# Patient Record
Sex: FEMALE | Race: WHITE | NOT HISPANIC OR LATINO | Employment: FULL TIME | URBAN - METROPOLITAN AREA
[De-identification: names, ages, dates, MRNs, and addresses within clinical notes are randomized per-mention and may not be internally consistent; named-entity substitution may affect disease eponyms.]

---

## 2024-11-15 ENCOUNTER — OFFICE VISIT (OUTPATIENT)
Dept: FAMILY MEDICINE CLINIC | Facility: CLINIC | Age: 79
End: 2024-11-15

## 2024-11-15 VITALS
TEMPERATURE: 98 F | SYSTOLIC BLOOD PRESSURE: 184 MMHG | HEART RATE: 86 BPM | HEIGHT: 60 IN | WEIGHT: 120.4 LBS | OXYGEN SATURATION: 98 % | BODY MASS INDEX: 23.64 KG/M2 | DIASTOLIC BLOOD PRESSURE: 95 MMHG

## 2024-11-15 DIAGNOSIS — Z13.1 SCREENING FOR DIABETES MELLITUS: ICD-10-CM

## 2024-11-15 DIAGNOSIS — Z11.59 NEED FOR HEPATITIS C SCREENING TEST: ICD-10-CM

## 2024-11-15 DIAGNOSIS — Z13.220 ENCOUNTER FOR LIPID SCREENING FOR CARDIOVASCULAR DISEASE: ICD-10-CM

## 2024-11-15 DIAGNOSIS — R42 DIZZINESS: Primary | ICD-10-CM

## 2024-11-15 DIAGNOSIS — M54.2 NECK PAIN: ICD-10-CM

## 2024-11-15 DIAGNOSIS — Z11.4 SCREENING FOR HIV (HUMAN IMMUNODEFICIENCY VIRUS): ICD-10-CM

## 2024-11-15 DIAGNOSIS — R26.81 UNSTEADINESS ON FEET: ICD-10-CM

## 2024-11-15 DIAGNOSIS — Z13.6 ENCOUNTER FOR LIPID SCREENING FOR CARDIOVASCULAR DISEASE: ICD-10-CM

## 2024-11-15 DIAGNOSIS — H53.2 DOUBLE VISION: ICD-10-CM

## 2024-11-15 DIAGNOSIS — E55.9 VITAMIN D DEFICIENCY, UNSPECIFIED: ICD-10-CM
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
## 2024-11-21 ENCOUNTER — APPOINTMENT (OUTPATIENT)
Dept: LAB | Facility: CLINIC | Age: 79
End: 2024-11-21
Payer: COMMERCIAL

## 2024-11-21 ENCOUNTER — HOSPITAL ENCOUNTER (OUTPATIENT)
Dept: RADIOLOGY | Facility: HOSPITAL | Age: 79
Discharge: HOME | End: 2024-11-21
Payer: COMMERCIAL

## 2024-11-21 DIAGNOSIS — E55.9 VITAMIN D DEFICIENCY, UNSPECIFIED: ICD-10-CM

## 2024-11-21 DIAGNOSIS — Z13.6 ENCOUNTER FOR LIPID SCREENING FOR CARDIOVASCULAR DISEASE: ICD-10-CM

## 2024-11-21 DIAGNOSIS — Z11.4 SCREENING FOR HIV (HUMAN IMMUNODEFICIENCY VIRUS): ICD-10-CM

## 2024-11-21 DIAGNOSIS — H53.2 DOUBLE VISION: ICD-10-CM

## 2024-11-21 DIAGNOSIS — R26.81 UNSTEADINESS ON FEET: ICD-10-CM

## 2024-11-21 DIAGNOSIS — Z13.1 SCREENING FOR DIABETES MELLITUS: ICD-10-CM

## 2024-11-21 DIAGNOSIS — Z13.220 ENCOUNTER FOR LIPID SCREENING FOR CARDIOVASCULAR DISEASE: ICD-10-CM

## 2024-11-21 DIAGNOSIS — Z11.59 NEED FOR HEPATITIS C SCREENING TEST: ICD-10-CM

## 2024-11-21 DIAGNOSIS — R42 DIZZINESS: ICD-10-CM

## 2024-11-21 DIAGNOSIS — M54.2 NECK PAIN: ICD-10-CM

## 2024-11-21 LAB
25(OH)D3 SERPL-MCNC: 20.5 NG/ML (ref 30–100)
ALBUMIN SERPL BCG-MCNC: 4.3 G/DL (ref 3.5–5)
ALP SERPL-CCNC: 65 U/L (ref 34–104)
ALT SERPL W P-5'-P-CCNC: 14 U/L (ref 7–52)
ANION GAP SERPL CALCULATED.3IONS-SCNC: 6 MMOL/L (ref 4–13)
AST SERPL W P-5'-P-CCNC: 21 U/L (ref 13–39)
BASOPHILS # BLD AUTO: 0.05 THOUSANDS/ÂΜL (ref 0–0.1)
BASOPHILS NFR BLD AUTO: 1 % (ref 0–1)
BILIRUB SERPL-MCNC: 0.36 MG/DL (ref 0.2–1)
BUN SERPL-MCNC: 20 MG/DL (ref 5–25)
CALCIUM SERPL-MCNC: 10.1 MG/DL (ref 8.4–10.2)
CHLORIDE SERPL-SCNC: 106 MMOL/L (ref 96–108)
CHOLEST SERPL-MCNC: 291 MG/DL (ref ?–200)
CO2 SERPL-SCNC: 28 MMOL/L (ref 21–32)
CREAT SERPL-MCNC: 0.74 MG/DL (ref 0.6–1.3)
EOSINOPHIL # BLD AUTO: 0.16 THOUSAND/ÂΜL (ref 0–0.61)
EOSINOPHIL NFR BLD AUTO: 4 % (ref 0–6)
ERYTHROCYTE [DISTWIDTH] IN BLOOD BY AUTOMATED COUNT: 14 % (ref 11.6–15.1)
GFR SERPL CREATININE-BSD FRML MDRD: 77 ML/MIN/1.73SQ M
GLUCOSE P FAST SERPL-MCNC: 86 MG/DL (ref 65–99)
HCT VFR BLD AUTO: 38.9 % (ref 34.8–46.1)
HCV AB SER QL: NORMAL
HDLC SERPL-MCNC: 62 MG/DL
HGB BLD-MCNC: 12.2 G/DL (ref 11.5–15.4)
IMM GRANULOCYTES # BLD AUTO: 0.01 THOUSAND/UL (ref 0–0.2)
IMM GRANULOCYTES NFR BLD AUTO: 0 % (ref 0–2)
LDLC SERPL CALC-MCNC: 201 MG/DL (ref 0–100)
LYMPHOCYTES # BLD AUTO: 1.24 THOUSANDS/ÂΜL (ref 0.6–4.47)
LYMPHOCYTES NFR BLD AUTO: 32 % (ref 14–44)
MCH RBC QN AUTO: 26.3 PG (ref 26.8–34.3)
MCHC RBC AUTO-ENTMCNC: 31.4 G/DL (ref 31.4–37.4)
MCV RBC AUTO: 84 FL (ref 82–98)
MONOCYTES # BLD AUTO: 0.37 THOUSAND/ÂΜL (ref 0.17–1.22)
MONOCYTES NFR BLD AUTO: 10 % (ref 4–12)
NEUTROPHILS # BLD AUTO: 2.04 THOUSANDS/ÂΜL (ref 1.85–7.62)
NEUTS SEG NFR BLD AUTO: 53 % (ref 43–75)
NONHDLC SERPL-MCNC: 229 MG/DL
NRBC BLD AUTO-RTO: 0 /100 WBCS
PLATELET # BLD AUTO: 231 THOUSANDS/UL (ref 149–390)
PMV BLD AUTO: 10.5 FL (ref 8.9–12.7)
POTASSIUM SERPL-SCNC: 3.9 MMOL/L (ref 3.5–5.3)
PROT SERPL-MCNC: 7.1 G/DL (ref 6.4–8.4)
RBC # BLD AUTO: 4.63 MILLION/UL (ref 3.81–5.12)
SODIUM SERPL-SCNC: 140 MMOL/L (ref 135–147)
TRIGL SERPL-MCNC: 138 MG/DL (ref ?–150)
TSH SERPL DL<=0.05 MIU/L-ACNC: 1.87 UIU/ML (ref 0.45–4.5)
VIT B12 SERPL-MCNC: 1042 PG/ML (ref 180–914)
WBC # BLD AUTO: 3.87 THOUSAND/UL (ref 4.31–10.16)

## 2024-11-21 PROCEDURE — 86803 HEPATITIS C AB TEST: CPT

## 2024-11-21 PROCEDURE — 80061 LIPID PANEL: CPT

## 2024-11-21 PROCEDURE — 87389 HIV-1 AG W/HIV-1&-2 AB AG IA: CPT

## 2024-11-21 PROCEDURE — 80053 COMPREHEN METABOLIC PANEL: CPT

## 2024-11-21 PROCEDURE — 82306 VITAMIN D 25 HYDROXY: CPT

## 2024-11-21 PROCEDURE — 72040 X-RAY EXAM NECK SPINE 2-3 VW: CPT

## 2024-11-21 PROCEDURE — 84443 ASSAY THYROID STIM HORMONE: CPT

## 2024-11-21 PROCEDURE — 36415 COLL VENOUS BLD VENIPUNCTURE: CPT

## 2024-11-21 PROCEDURE — 85025 COMPLETE CBC W/AUTO DIFF WBC: CPT

## 2024-11-21 PROCEDURE — 82607 VITAMIN B-12: CPT

## 2024-11-22 ENCOUNTER — RESULTS FOLLOW-UP (OUTPATIENT)
Dept: FAMILY MEDICINE CLINIC | Facility: CLINIC | Age: 79
End: 2024-11-22

## 2024-11-22 ENCOUNTER — OFFICE VISIT (OUTPATIENT)
Dept: FAMILY MEDICINE CLINIC | Facility: CLINIC | Age: 79
End: 2024-11-22
Payer: COMMERCIAL

## 2024-11-22 VITALS
HEART RATE: 73 BPM | OXYGEN SATURATION: 96 % | TEMPERATURE: 97.5 F | SYSTOLIC BLOOD PRESSURE: 170 MMHG | DIASTOLIC BLOOD PRESSURE: 108 MMHG

## 2024-11-22 DIAGNOSIS — I10 PRIMARY HYPERTENSION: Primary | ICD-10-CM

## 2024-11-22 DIAGNOSIS — E55.9 VITAMIN D DEFICIENCY: ICD-10-CM

## 2024-11-22 DIAGNOSIS — Z78.0 POST-MENOPAUSAL: ICD-10-CM

## 2024-11-22 DIAGNOSIS — E78.00 PURE HYPERCHOLESTEROLEMIA: ICD-10-CM

## 2024-11-22 DIAGNOSIS — R42 DIZZINESS: ICD-10-CM

## 2024-11-22 DIAGNOSIS — M43.12 ANTEROLISTHESIS OF CERVICAL SPINE: ICD-10-CM

## 2024-11-22 PROBLEM — R03.0 ELEVATED BLOOD PRESSURE READING: Status: ACTIVE | Noted: 2024-11-22

## 2024-11-22 LAB — HIV 1+2 AB+HIV1 P24 AG SERPL QL IA: NON REACTIVE

## 2024-11-22 PROCEDURE — 99213 OFFICE O/P EST LOW 20 MIN: CPT

## 2024-11-22 PROCEDURE — G2211 COMPLEX E/M VISIT ADD ON: HCPCS

## 2024-11-22 RX ORDER — AMLODIPINE BESYLATE 5 MG/1
5 TABLET ORAL DAILY
Qty: 30 TABLET | Refills: 0 | Status: SHIPPED | OUTPATIENT
Start: 2024-11-22

## 2024-11-22 RX ORDER — ROSUVASTATIN CALCIUM 20 MG/1
20 TABLET, COATED ORAL DAILY
Qty: 90 TABLET | Refills: 0 | Status: SHIPPED | OUTPATIENT
Start: 2024-11-22

## 2024-11-22 NOTE — ASSESSMENT & PLAN NOTE
Blood Pressure: (!) 170/108, goal BP <140/90  Cr: 0.74, GFR 77, normal kidney function    Plan:  Start amlodipine 5mg daily  Home blood pressure log   ED precautions given    Orders:    rosuvastatin (CRESTOR) 20 MG tablet; Take 1 tablet (20 mg total) by mouth daily    amLODIPine (NORVASC) 5 mg tablet; Take 1 tablet (5 mg total) by mouth daily

## 2024-11-22 NOTE — ASSESSMENT & PLAN NOTE
Vitamin D level 20  Start vitamin D 1000U daily   Orders:    Cholecalciferol (VITAMIN D3) 1,000 units tablet; Take 1 tablet (1,000 Units total) by mouth daily
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
## 2024-11-22 NOTE — ASSESSMENT & PLAN NOTE
Hyperlipidemia not at goal  Lab Results   Component Value Date    CHOLESTEROL 291 (H) 11/21/2024    TRIG 138 11/21/2024    HDL 62 11/21/2024    LDLCALC 201 (H) 11/21/2024     The 10-year ASCVD risk score (Clarence RENDON, et al., 2019) is: 44.8%    Values used to calculate the score:      Age: 79 years      Sex: Female      Is Non- : No      Diabetic: No      Tobacco smoker: No      Systolic Blood Pressure: 170 mmHg      Is BP treated: Yes      HDL Cholesterol: 62 mg/dL      Total Cholesterol: 291 mg/dL    Plan:  Start Crestor 20mg   F/u Lipid panel in 6 months    Orders:    rosuvastatin (CRESTOR) 20 MG tablet; Take 1 tablet (20 mg total) by mouth daily
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
## 2024-11-25 ENCOUNTER — NEW PATIENT (OUTPATIENT)
Dept: URBAN - METROPOLITAN AREA CLINIC 6 | Facility: CLINIC | Age: 79
End: 2024-11-25

## 2024-11-25 DIAGNOSIS — H25.813: ICD-10-CM

## 2024-11-25 DIAGNOSIS — S06.0X0D: ICD-10-CM

## 2024-11-25 DIAGNOSIS — H50.10: ICD-10-CM

## 2024-11-25 PROCEDURE — 92083 EXTENDED VISUAL FIELD XM: CPT

## 2024-11-25 PROCEDURE — 92004 COMPRE OPH EXAM NEW PT 1/>: CPT

## 2024-11-25 ASSESSMENT — TONOMETRY
OS_IOP_MMHG: 9
OD_IOP_MMHG: 10

## 2024-11-25 ASSESSMENT — VISUAL ACUITY
OD_PH: 20/60
OS_CC: 20/70-2
OD_CC: 20/100-1
OU_CC: J2
OS_PH: 20/60+2
OU_CC: 20/100

## 2024-12-10 ENCOUNTER — OFFICE VISIT (OUTPATIENT)
Dept: FAMILY MEDICINE CLINIC | Facility: CLINIC | Age: 79
End: 2024-12-10
Payer: COMMERCIAL

## 2024-12-10 VITALS
OXYGEN SATURATION: 97 % | TEMPERATURE: 97.8 F | SYSTOLIC BLOOD PRESSURE: 158 MMHG | HEART RATE: 87 BPM | DIASTOLIC BLOOD PRESSURE: 100 MMHG

## 2024-12-10 DIAGNOSIS — E78.00 PURE HYPERCHOLESTEROLEMIA: ICD-10-CM

## 2024-12-10 DIAGNOSIS — H53.2 DIPLOPIA: ICD-10-CM

## 2024-12-10 DIAGNOSIS — R42 DIZZINESS: ICD-10-CM

## 2024-12-10 DIAGNOSIS — R51.9 PERSISTENT HEADACHES: ICD-10-CM

## 2024-12-10 DIAGNOSIS — I10 PRIMARY HYPERTENSION: Primary | ICD-10-CM

## 2024-12-10 DIAGNOSIS — M43.12 ANTEROLISTHESIS OF CERVICAL SPINE: ICD-10-CM

## 2024-12-10 PROCEDURE — 99214 OFFICE O/P EST MOD 30 MIN: CPT

## 2024-12-10 PROCEDURE — 93000 ELECTROCARDIOGRAM COMPLETE: CPT

## 2024-12-10 RX ORDER — AMLODIPINE BESYLATE 5 MG/1
TABLET ORAL
Qty: 60 TABLET | Refills: 0 | Status: SHIPPED | OUTPATIENT
Start: 2024-12-10

## 2024-12-10 RX ORDER — AMLODIPINE BESYLATE 5 MG/1
TABLET ORAL
Qty: 60 TABLET | Refills: 0 | Status: SHIPPED | OUTPATIENT
Start: 2024-12-10 | End: 2024-12-10

## 2024-12-10 NOTE — ASSESSMENT & PLAN NOTE
Hyperlipidemia not at goal  Lab Results   Component Value Date    CHOLESTEROL 291 (H) 11/21/2024    TRIG 138 11/21/2024    HDL 62 11/21/2024    LDLCALC 201 (H) 11/21/2024     The 10-year ASCVD risk score (Clarence RENDON, et al., 2019) is: 40.1%    Values used to calculate the score:      Age: 79 years      Sex: Female      Is Non- : No      Diabetic: No      Tobacco smoker: No      Systolic Blood Pressure: 158 mmHg      Is BP treated: Yes      HDL Cholesterol: 62 mg/dL      Total Cholesterol: 291 mg/dL    Plan:  Start Crestor 20mg   F/u Lipid panel in 5 months

## 2024-12-10 NOTE — ASSESSMENT & PLAN NOTE
Blood Pressure: 158/100, goal BP <140/90.   Home blood pressures: 125-141/78-80s  Cr: 0.74, GFR 77, normal kidney function    Plan:  Increased to amlodipine 5mg BID  Home blood pressure log   ED precautions given    Orders:    POCT ECG    amLODIPine (NORVASC) 5 mg tablet; Take one tablet (5mg) twice per day

## 2024-12-10 NOTE — PROGRESS NOTES
Name: Doreen Bello      : 1945      MRN: [id number]  Encounter Provider: Jennifer Smith DO  Encounter Date: 12/10/2024   [facility]  :  Assessment & Plan  Primary hypertension  Blood Pressure: 158/100, goal BP <140/90.   Home blood pressures: 125-141/78-80s  Cr: 0.74, GFR 77, normal kidney function    Plan:  Increased to amlodipine 5mg BID  Home blood pressure log   ED precautions given    Orders:    POCT ECG    amLODIPine (NORVASC) 5 mg tablet; Take one tablet (5mg) twice per day      Persistent headaches  History of multiple concussions and recent head trauma with occipital lobe head strike which was over one month ago.   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 Basal ganglia exam intact with normal nose to finger and normal MARKO. Normal heel to shine  No further episodes of dizziness since marathon.   No anemia, thyroid disorder, or electrolyte abnormalities on labs  Dizziness has resolved completely   Suspect dizziness most likely from uncontrolled HTN, other ddx includes vertigo vs previous stroke (however symptoms of dizziness has resolved and no red flag symptoms) vs anxiety vs malnutrition  Per  patient, ophthalmology noted patient has cataracts but no other abnormal findings  PCOT EKG: NSR, rate 68        Plan:  CT head without contrast, CT head and neck with IV contrast  F/u Echo due to black vision with heavy lifting  Increased to amlodipine 5mg BID  Monitor home blood pressure daily  Neurology referral  Discussed in detail ED precautions  Follow up in 2 week    Orders:    Ambulatory Referral to Neurology; Future    CT head wo contrast; Future    CTA head and neck w wo contrast; Future    Echo complete w/ contrast if indicated; Future    POCT ECG    Dizziness  History of multiple concussions and recent head  trauma with occipital lobe head strike which was over one month ago.   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 Basal ganglia exam intact with normal nose to finger and normal MARKO. Normal heel to shine  No further episodes of dizziness since marathon.   No anemia, thyroid disorder, or electrolyte abnormalities on labs  Dizziness has resolved completely   Suspect dizziness most likely from uncontrolled HTN, other ddx includes vertigo vs previous stroke (however symptoms of dizziness has resolved and no red flag symptoms) vs anxiety vs malnutrition  Per  patient, ophthalmology noted patient has cataracts but no other abnormal findings        Plan:  CT head without contrast, CT head and neck with IV contrast  F/u Echo due to black vision with heavy lifting  Increased to amlodipine 5mg BID  Monitor home blood pressure daily  Neurology referral  Discussed in detail ED precautions  Follow up in 2 week    Orders:    Echo complete w/ contrast if indicated; Future    POCT ECG      Diplopia  History of multiple concussions and recent head trauma with occipital lobe head strike which was over one month ago.   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 Basal ganglia exam intact with normal nose to finger and normal MARKO. Normal heel to shine  EOMI, notes right eye has blurred vision in the center after EOMI when tested alone, notes left eye has slight double vision noted to be faint on top of each other vertically which is new when tested alone.  No further episodes of dizziness since marathon.   No anemia, thyroid disorder, or electrolyte abnormalities on labs  Dizziness has resolved completely   Suspect dizziness most likely from uncontrolled HTN, other ddx  includes vertigo vs previous stroke (however symptoms of dizziness has resolved and no red flag symptoms) vs anxiety vs malnutrition  Per  patient, ophthalmology noted patient has cataracts but no other abnormal findings  PCOT EKG: NSR, rate 68        Plan:  CT head without contrast, CT head and neck with IV contrast  F/u Echo due to black vision with heavy lifting  Increased to amlodipine 5mg BID  Monitor home blood pressure daily  Neurology referral  Discussed in detail ED precautions  Follow up in 2 week  Orders:    Ambulatory Referral to Neurology; Future    CT head wo contrast; Future    CTA head and neck w wo contrast; Future    Echo complete w/ contrast if indicated; Future    POCT ECG    Pure hypercholesterolemia  Hyperlipidemia not at goal  Lab Results   Component Value Date    CHOLESTEROL 291 (H) 11/21/2024    TRIG 138 11/21/2024    HDL 62 11/21/2024    LDLCALC 201 (H) 11/21/2024     The 10-year ASCVD risk score (Clarence RENDON, et al., 2019) is: 40.1%    Values used to calculate the score:      Age: 79 years      Sex: Female      Is Non- : No      Diabetic: No      Tobacco smoker: No      Systolic Blood Pressure: 158 mmHg      Is BP treated: Yes      HDL Cholesterol: 62 mg/dL      Total Cholesterol: 291 mg/dL    Plan:  Start Crestor 20mg   F/u Lipid panel in 5 months           Anterolisthesis of cervical spine  History of trauma that resulted in back on head strike, No LOC on Octobber 23rd, 2024, with history of multiple concussions in the past  CXR: Mild anterolisthesis of C3 on C4. Slight anterolisthesis of C4 on C5. Multilevel degenerative facet hypertrophy and disc space narrowing in the mid to lower cervical spine.  No neck pain or red flag symptoms     Plan:  Continue to monitor   ED precautions given                   History of Present Illness     HPI  78 yo female with pmhx including multiple concussions, history of perpherial vision lost 2/2 to diplopia and strabismus release  "in 1970s, present to the office for follow-up for dizziness.     Reports persistent headache since last visits-occipital that radiates to frontal lobe. No dizziness. No loss of equilbirum. Has popping of neck.   Reports vision has been \"weird\", very difficult to make one object one with visit. History of strabimus, released strabimus in 1970s    Saw Opthpmologist, Dr. Forbes, said it was cataracts, no other ophthalmologist concerns.      Takes amlodipine 5mg and crestor 20mg at night. Denies any lightheadedness, dizziness.      Updated home Blood pressures on amlodipine 5mg:       Reports able to turn head turn left and right without pain. Denies any numbness or tingling down arms. Denies any hand  strength.           Previous Blood pressure log at home prior to starting amlodipine:           Review of Systems   Constitutional:  Negative for chills, diaphoresis and fever.   Eyes:  Negative for visual disturbance.   Respiratory:  Negative for shortness of breath and wheezing.    Cardiovascular:  Negative for chest pain, palpitations and leg swelling.   Gastrointestinal:  Negative for abdominal pain, nausea and vomiting.   Neurological:  Positive for headaches. Negative for dizziness, syncope and light-headedness.     Medical History Reviewed by provider this encounter:  Tobacco  Allergies  Meds  Problems  Med Hx  Surg Hx  Fam Hx     .  Past Medical History   Past Medical History:   Diagnosis Date    Concussion     Diplopia     Pure hypercholesterolemia 11/22/2024     Past Surgical History:   Procedure Laterality Date    KNEE SURGERY Left     in 1996 x2 (had revision within 6 months of each other)    TONSILLECTOMY AND ADENOIDECTOMY      at 8yo     History reviewed. No pertinent family history.   reports that she has never smoked. She has never used smokeless tobacco. She reports current alcohol use. She reports that she does not use drugs.  Current Outpatient Medications on File Prior to Visit   Medication " Sig Dispense Refill    rosuvastatin (CRESTOR) 20 MG tablet Take 1 tablet (20 mg total) by mouth daily 90 tablet 0    [DISCONTINUED] amLODIPine (NORVASC) 5 mg tablet Take 1 tablet (5 mg total) by mouth daily 30 tablet 0    Cholecalciferol (VITAMIN D3) 1,000 units tablet Take 1 tablet (1,000 Units total) by mouth daily 90 tablet 0     No current facility-administered medications on file prior to visit.     Allergies   Allergen Reactions    Penicillins Rash    Bee Venom Other (See Comments)     Excessive local reaction    Medical Tape Rash    Tomato - Food Allergy Rash      Current Outpatient Medications on File Prior to Visit   Medication Sig Dispense Refill    rosuvastatin (CRESTOR) 20 MG tablet Take 1 tablet (20 mg total) by mouth daily 90 tablet 0    [DISCONTINUED] amLODIPine (NORVASC) 5 mg tablet Take 1 tablet (5 mg total) by mouth daily 30 tablet 0    Cholecalciferol (VITAMIN D3) 1,000 units tablet Take 1 tablet (1,000 Units total) by mouth daily 90 tablet 0     No current facility-administered medications on file prior to visit.      Social History     Tobacco Use    Smoking status: Never    Smokeless tobacco: Never   Vaping Use    Vaping status: Never Used   Substance and Sexual Activity    Alcohol use: Yes     Comment: ocassionally    Drug use: Never    Sexual activity: Not on file        Objective   /100 (BP Location: Left arm, Patient Position: Sitting, Cuff Size: Large)   Pulse 87   Temp 97.8 °F (36.6 °C)   SpO2 97%      Physical Exam  Vitals and nursing note reviewed.   Constitutional:       General: She is not in acute distress.     Appearance: She is well-developed.      Comments: Very thin   HENT:      Head: Normocephalic and atraumatic.   Eyes:      Extraocular Movements: Extraocular movements intact.      Conjunctiva/sclera: Conjunctivae normal.      Pupils: Pupils are equal, round, and reactive to light.      Comments: notes right eye has blurred vision in the center after EOMI when tested  alone, notes left eye has slight double vision noted to be faint on top of each other vertically which is new when tested alone.     Cardiovascular:      Rate and Rhythm: Normal rate and regular rhythm.      Heart sounds: No murmur heard.  Pulmonary:      Effort: Pulmonary effort is normal. No respiratory distress.      Breath sounds: Normal breath sounds. No stridor. No wheezing or rales.   Abdominal:      General: Bowel sounds are normal. There is no distension.      Palpations: Abdomen is soft.      Tenderness: There is no abdominal tenderness. There is no guarding.   Musculoskeletal:         General: No swelling.      Cervical back: Neck supple.      Right lower leg: No edema.      Left lower leg: No edema.   Skin:     General: Skin is warm and dry.      Capillary Refill: Capillary refill takes less than 2 seconds.   Neurological:      Mental Status: She is alert.      Motor: No weakness.      Coordination: Coordination is intact. Finger-Nose-Finger Test and Heel to Shin Test normal. Rapid alternating movements normal.      Gait: Gait is intact.   Psychiatric:         Mood and Affect: Mood normal.

## 2024-12-10 NOTE — ASSESSMENT & PLAN NOTE
History of multiple concussions and recent head trauma with occipital lobe head strike which was over one month ago.   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 Basal ganglia exam intact with normal nose to finger and normal MARKO. Normal heel to shine  No further episodes of dizziness since marathon.   No anemia, thyroid disorder, or electrolyte abnormalities on labs  Dizziness has resolved completely   Suspect dizziness most likely from uncontrolled HTN, other ddx includes vertigo vs previous stroke (however symptoms of dizziness has resolved and no red flag symptoms) vs anxiety vs malnutrition  Per  patient, ophthalmology noted patient has cataracts but no other abnormal findings        Plan:  CT head without contrast, CT head and neck with IV contrast  F/u Echo due to black vision with heavy lifting  Increased to amlodipine 5mg BID  Monitor home blood pressure daily  Neurology referral  Discussed in detail ED precautions  Follow up in 2 week    Orders:    Echo complete w/ contrast if indicated; Future    POCT ECG

## 2024-12-16 ENCOUNTER — HOSPITAL ENCOUNTER (OUTPATIENT)
Dept: CT IMAGING | Facility: HOSPITAL | Age: 79
Discharge: HOME | End: 2024-12-16
Payer: COMMERCIAL

## 2024-12-16 DIAGNOSIS — H53.2 DIPLOPIA: ICD-10-CM

## 2024-12-16 DIAGNOSIS — R51.9 PERSISTENT HEADACHES: ICD-10-CM

## 2024-12-16 PROCEDURE — 70450 CT HEAD/BRAIN W/O DYE: CPT

## 2024-12-27 ENCOUNTER — OFFICE VISIT (OUTPATIENT)
Dept: FAMILY MEDICINE CLINIC | Facility: CLINIC | Age: 79
End: 2024-12-27
Payer: COMMERCIAL

## 2024-12-27 VITALS
OXYGEN SATURATION: 95 % | SYSTOLIC BLOOD PRESSURE: 151 MMHG | HEART RATE: 93 BPM | DIASTOLIC BLOOD PRESSURE: 91 MMHG | TEMPERATURE: 97.4 F

## 2024-12-27 DIAGNOSIS — Z86.73 HISTORY OF STROKE: ICD-10-CM

## 2024-12-27 DIAGNOSIS — E78.00 PURE HYPERCHOLESTEROLEMIA: ICD-10-CM

## 2024-12-27 DIAGNOSIS — I10 PRIMARY HYPERTENSION: Primary | ICD-10-CM

## 2024-12-27 PROCEDURE — G2211 COMPLEX E/M VISIT ADD ON: HCPCS

## 2024-12-27 PROCEDURE — 99213 OFFICE O/P EST LOW 20 MIN: CPT

## 2024-12-27 RX ORDER — LISINOPRIL 5 MG/1
5 TABLET ORAL DAILY
Qty: 30 TABLET | Refills: 0 | Status: SHIPPED | OUTPATIENT
Start: 2024-12-27

## 2024-12-27 RX ORDER — ASPIRIN 325 MG
325 TABLET, DELAYED RELEASE (ENTERIC COATED) ORAL DAILY
COMMUNITY

## 2024-12-27 NOTE — ASSESSMENT & PLAN NOTE
Blood Pressure: 151/91, Repeat blood pressure:154/86, Not at goal, goal BP <140/90.   Home blood pressures: 125-141/78-80s  Cr: 0.74, GFR 77, normal kidney function    Plan:  Continue amlodipine 5mg BID  Add Lisinopril 5mg daily   Home blood pressure log   ED precautions given    Orders:    lisinopril (ZESTRIL) 5 mg tablet; Take 1 tablet (5 mg total) by mouth daily    Basic metabolic panel; Future

## 2024-12-27 NOTE — PROGRESS NOTES
Name: Doreen Bello      : 1945      MRN: [id number]  Encounter Provider: Jennifer Smith DO  Encounter Date: 2024   Encounter department: [facility]  :  Assessment & Plan  Primary hypertension  Blood Pressure: 151/91, Repeat blood pressure:154/86, Not at goal, goal BP <140/90.   Home blood pressures: 125-141/78-80s  Cr: 0.74, GFR 77, normal kidney function    Plan:  Continue amlodipine 5mg BID  Add Lisinopril 5mg daily   Home blood pressure log   ED precautions given    Orders:    lisinopril (ZESTRIL) 5 mg tablet; Take 1 tablet (5 mg total) by mouth daily    Basic metabolic panel; Future        History of stroke  History of multiple concussions and recent head trauma with occipital lobe head strike in 2024  No nystagmus on exam, peripheral fields reduced however per patient this is her baseline after having strabismus release in the  for diplopia and has had unchanged diplopia since. Cerebellar exam intact with normal nose to finger. Normal MARKO.  Noted to have elevated blood pressure, Blood Pressure: 151/91, repeat /86. Discussed concerns for elevated BP can lead to stroke, MI, CKD, cardiovascular disease.   CT head- No acute intracranial abnormality. 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  Patient taking ASA 325mg daily for joint pain. Discussed the risk and benefits of being daily ASA with brain bleeds/head trauma    Plan:  Continue Crestor 20mg daily  Continue amlodipine 5mg BID  Start Lisinopril 5mg daily  Continue monitor blood pressures daily   Continue ASA 325mg daily, consider discussing tylenol over ASA at next follow up   Discussed in detail ED precautions  Follow up in 2 week  Follow up with neurology on 2025         Pure hypercholesterolemia  Hyperlipidemia not at goal  Lab Results   Component Value Date    CHOLESTEROL 291 (H) 2024    TRIG 138 2024    HDL 62  "11/21/2024    LDLCALC 201 (H) 11/21/2024     The 10-year ASCVD risk score (Clarence RENDON, et al., 2019) is: 37.3%    Values used to calculate the score:      Age: 79 years      Sex: Female      Is Non- : No      Diabetic: No      Tobacco smoker: No      Systolic Blood Pressure: 151 mmHg      Is BP treated: Yes      HDL Cholesterol: 62 mg/dL      Total Cholesterol: 291 mg/dL    Plan:  Continue Crestor 20mg   F/u Lipid panel in 5 months                    History of Present Illness     HPI  78 yo female with pmhx including multiple concussions, history of perpherial vision lost 2/2 to diplopia and strabismus release in 1970s, present to the office for follow-up for dizziness/ headaches. Reports dizziness has completely resolved, last episode was during her marathon on October 23.      Reports 40 miles walking last week. Denies any headache, light headedeness, dizziness, SOB. Reports years ago had terrible migraines. Tried taking medication but it made it worse. Related to strabimus, went away after wearing glasses. Reports taking her amlodipine 5mg BID. Home blood pressures ranges from 120-150s. Initial Blood Pressure: 151/91, repeat blood pressure 154/86. Reviewed CT head. Patient denies any focal deficits, weakness, balance issues, lightheadedness, dizziness, chest pain, headaches. She has not gone for her Echo yet.         Reviewed CT head:  \"No acute intracranial abnormality.  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  Several soft tissue masses of the scalp of varying density with calcification, correlate for partially calcified sebaceous cysts\"          Updated home blood pressures on amlodipine 5mg BID:          Review of Systems   Constitutional:  Negative for chills, diaphoresis and fever.   Eyes:  Negative for visual disturbance.   Respiratory:  Negative for shortness of " breath and wheezing.    Cardiovascular:  Negative for chest pain, palpitations and leg swelling.   Gastrointestinal:  Negative for abdominal pain, nausea and vomiting.   Neurological:  Positive for headaches. Negative for dizziness, syncope and light-headedness.     Medical History Reviewed by provider this encounter:     .  Past Medical History   Past Medical History:   Diagnosis Date    Concussion     Diplopia     Pure hypercholesterolemia 11/22/2024     Past Surgical History:   Procedure Laterality Date    KNEE SURGERY Left     in 1996 x2 (had revision within 6 months of each other)    TONSILLECTOMY AND ADENOIDECTOMY      at 8yo     No family history on file.   reports that she has never smoked. She has never used smokeless tobacco. She reports current alcohol use. She reports that she does not use drugs.  Current Outpatient Medications on File Prior to Visit   Medication Sig Dispense Refill    aspirin (ECOTRIN) 325 mg EC tablet Take 325 mg by mouth daily      amLODIPine (NORVASC) 5 mg tablet Take one tablet (5mg) twice per day 60 tablet 0    Cholecalciferol (VITAMIN D3) 1,000 units tablet Take 1 tablet (1,000 Units total) by mouth daily 90 tablet 0    rosuvastatin (CRESTOR) 20 MG tablet Take 1 tablet (20 mg total) by mouth daily 90 tablet 0     No current facility-administered medications on file prior to visit.     Allergies   Allergen Reactions    Penicillins Rash    Bee Venom Other (See Comments)     Excessive local reaction    Medical Tape Rash    Tomato - Food Allergy Rash      Current Outpatient Medications on File Prior to Visit   Medication Sig Dispense Refill    aspirin (ECOTRIN) 325 mg EC tablet Take 325 mg by mouth daily      amLODIPine (NORVASC) 5 mg tablet Take one tablet (5mg) twice per day 60 tablet 0    Cholecalciferol (VITAMIN D3) 1,000 units tablet Take 1 tablet (1,000 Units total) by mouth daily 90 tablet 0    rosuvastatin (CRESTOR) 20 MG tablet Take 1 tablet (20 mg total) by mouth daily 90  tablet 0     No current facility-administered medications on file prior to visit.      Social History     Tobacco Use    Smoking status: Never    Smokeless tobacco: Never   Vaping Use    Vaping status: Never Used   Substance and Sexual Activity    Alcohol use: Yes     Comment: ocassionally    Drug use: Never    Sexual activity: Not on file        Objective   /91 (BP Location: Left arm, Patient Position: Sitting, Cuff Size: Standard)   Pulse 93   Temp (!) 97.4 °F (36.3 °C)   SpO2 95%      Physical Exam  Vitals and nursing note reviewed.   Constitutional:       General: She is not in acute distress.     Appearance: She is well-developed.      Comments: Very thin   HENT:      Head: Normocephalic and atraumatic.   Eyes:      Extraocular Movements: Extraocular movements intact.      Conjunctiva/sclera: Conjunctivae normal.      Pupils: Pupils are equal, round, and reactive to light.      Comments: notes right eye has blurred vision in the center after EOMI when tested alone, notes left eye has slight double vision noted to be faint on top of each other vertically which is new when tested alone.     Cardiovascular:      Rate and Rhythm: Normal rate and regular rhythm.      Heart sounds: No murmur heard.  Pulmonary:      Effort: Pulmonary effort is normal. No respiratory distress.      Breath sounds: Normal breath sounds. No stridor. No wheezing or rales.   Abdominal:      General: Bowel sounds are normal. There is no distension.      Palpations: Abdomen is soft.      Tenderness: There is no abdominal tenderness. There is no guarding.   Musculoskeletal:         General: No swelling.      Cervical back: Neck supple.      Right lower leg: No edema.      Left lower leg: No edema.   Skin:     General: Skin is warm and dry.      Capillary Refill: Capillary refill takes less than 2 seconds.   Neurological:      Mental Status: She is alert.      Motor: No weakness.      Coordination: Coordination is intact.  Finger-Nose-Finger Test and Heel to Shin Test normal. Rapid alternating movements normal.      Gait: Gait is intact.   Psychiatric:         Mood and Affect: Mood normal.

## 2024-12-27 NOTE — ASSESSMENT & PLAN NOTE
History of multiple concussions and recent head trauma with occipital lobe head strike in October 2024  No nystagmus on exam, peripheral fields reduced however per patient this is her baseline after having strabismus release in the 1970s for diplopia and has had unchanged diplopia since. Cerebellar exam intact with normal nose to finger. Normal MARKO.  Noted to have elevated blood pressure, Blood Pressure: 151/91, repeat /86. Discussed concerns for elevated BP can lead to stroke, MI, CKD, cardiovascular disease.   CT head- No acute intracranial abnormality. 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  Patient taking ASA 325mg daily for joint pain. Discussed the risk and benefits of being daily ASA with brain bleeds/head trauma    Plan:  Continue Crestor 20mg daily  Continue amlodipine 5mg BID  Start Lisinopril 5mg daily  Continue monitor blood pressures daily   Continue ASA 325mg daily, consider discussing tylenol over ASA at next follow up   Discussed in detail ED precautions  Follow up in 2 week  Follow up with neurology on 2/4/2025

## 2024-12-27 NOTE — ASSESSMENT & PLAN NOTE
Hyperlipidemia not at goal  Lab Results   Component Value Date    CHOLESTEROL 291 (H) 11/21/2024    TRIG 138 11/21/2024    HDL 62 11/21/2024    LDLCALC 201 (H) 11/21/2024     The 10-year ASCVD risk score (Clarence RENDON, et al., 2019) is: 37.3%    Values used to calculate the score:      Age: 79 years      Sex: Female      Is Non- : No      Diabetic: No      Tobacco smoker: No      Systolic Blood Pressure: 151 mmHg      Is BP treated: Yes      HDL Cholesterol: 62 mg/dL      Total Cholesterol: 291 mg/dL    Plan:  Continue Crestor 20mg   F/u Lipid panel in 5 months

## 2024-12-30 ENCOUNTER — PRE-OP/ASCAN (OUTPATIENT)
Dept: URBAN - METROPOLITAN AREA CLINIC 6 | Facility: CLINIC | Age: 79
End: 2024-12-30

## 2024-12-30 DIAGNOSIS — H25.813: ICD-10-CM

## 2024-12-30 PROBLEM — Z86.73 HISTORY OF STROKE: Status: ACTIVE | Noted: 2024-11-22

## 2024-12-30 PROCEDURE — 92136 OPHTHALMIC BIOMETRY: CPT

## 2024-12-30 PROCEDURE — 92012 INTRM OPH EXAM EST PATIENT: CPT

## 2024-12-30 ASSESSMENT — TONOMETRY
OD_IOP_MMHG: 11
OS_IOP_MMHG: 10

## 2024-12-30 ASSESSMENT — KERATOMETRY
OD_AXISANGLE2_DEGREES: 75
OS_K2POWER_DIOPTERS: 45.00
OD_K1POWER_DIOPTERS: 41.50
OS_AXISANGLE_DEGREES: 9
OS_K1POWER_DIOPTERS: 43.25
OD_AXISANGLE_DEGREES: 165
OD_K2POWER_DIOPTERS: 46.00
OS_AXISANGLE2_DEGREES: 99

## 2024-12-30 ASSESSMENT — VISUAL ACUITY
OD_CC: 20/200-1
OS_PH: 20/60+2
OD_PH: 20/60
OS_CC: 20/150-2

## 2025-01-03 ENCOUNTER — APPOINTMENT (OUTPATIENT)
Dept: LAB | Facility: CLINIC | Age: 80
End: 2025-01-03
Payer: COMMERCIAL

## 2025-01-03 DIAGNOSIS — I10 PRIMARY HYPERTENSION: ICD-10-CM

## 2025-01-03 LAB
ANION GAP SERPL CALCULATED.3IONS-SCNC: 4 MMOL/L (ref 4–13)
BUN SERPL-MCNC: 19 MG/DL (ref 5–25)
CALCIUM SERPL-MCNC: 10.1 MG/DL (ref 8.4–10.2)
CHLORIDE SERPL-SCNC: 106 MMOL/L (ref 96–108)
CO2 SERPL-SCNC: 28 MMOL/L (ref 21–32)
CREAT SERPL-MCNC: 0.68 MG/DL (ref 0.6–1.3)
GFR SERPL CREATININE-BSD FRML MDRD: 84 ML/MIN/1.73SQ M
GLUCOSE P FAST SERPL-MCNC: 82 MG/DL (ref 65–99)
POTASSIUM SERPL-SCNC: 5.6 MMOL/L (ref 3.5–5.3)
SODIUM SERPL-SCNC: 138 MMOL/L (ref 135–147)

## 2025-01-03 PROCEDURE — 80048 BASIC METABOLIC PNL TOTAL CA: CPT

## 2025-01-03 PROCEDURE — 36415 COLL VENOUS BLD VENIPUNCTURE: CPT

## 2025-01-06 ENCOUNTER — CONSULT (OUTPATIENT)
Dept: FAMILY MEDICINE CLINIC | Facility: CLINIC | Age: 80
End: 2025-01-06

## 2025-01-06 VITALS
WEIGHT: 123 LBS | HEIGHT: 60 IN | TEMPERATURE: 98.3 F | OXYGEN SATURATION: 97 % | SYSTOLIC BLOOD PRESSURE: 142 MMHG | BODY MASS INDEX: 24.15 KG/M2 | HEART RATE: 83 BPM | DIASTOLIC BLOOD PRESSURE: 73 MMHG

## 2025-01-06 DIAGNOSIS — Z86.73 HISTORY OF STROKE: ICD-10-CM

## 2025-01-06 DIAGNOSIS — E55.9 VITAMIN D DEFICIENCY: ICD-10-CM

## 2025-01-06 DIAGNOSIS — Z01.818 PREOP EXAMINATION: ICD-10-CM

## 2025-01-06 DIAGNOSIS — M43.12 ANTEROLISTHESIS OF CERVICAL SPINE: ICD-10-CM

## 2025-01-06 DIAGNOSIS — H50.9 STRABISMUS: ICD-10-CM

## 2025-01-06 DIAGNOSIS — E78.00 PURE HYPERCHOLESTEROLEMIA: ICD-10-CM

## 2025-01-06 DIAGNOSIS — I10 PRIMARY HYPERTENSION: ICD-10-CM

## 2025-01-06 DIAGNOSIS — H25.9 AGE-RELATED CATARACT OF BOTH EYES, UNSPECIFIED AGE-RELATED CATARACT TYPE: Primary | ICD-10-CM

## 2025-01-06 RX ORDER — AMLODIPINE BESYLATE 5 MG/1
5 TABLET ORAL DAILY
Start: 2025-01-06 | End: 2025-01-10

## 2025-01-06 NOTE — PATIENT INSTRUCTIONS
Taking amlodipine 5mg in the morning (1 tablet) for blood pressure  Taking Crestor 20mg at night time (1 tablet) for cholesterol  Taking Lisinopril 5mg at night time (1 tablet) for blood pressure     Stop aspirin on 01/07 and continue to hold until surgeries are over.

## 2025-01-06 NOTE — ASSESSMENT & PLAN NOTE
Hyperlipidemia not at goal  Lab Results   Component Value Date    CHOLESTEROL 291 (H) 11/21/2024    TRIG 138 11/21/2024    HDL 62 11/21/2024    LDLCALC 201 (H) 11/21/2024     The 10-year ASCVD risk score (Clarence RENDON, et al., 2019) is: 33.8%    Values used to calculate the score:      Age: 79 years      Sex: Female      Is Non- : No      Diabetic: No      Tobacco smoker: No      Systolic Blood Pressure: 142 mmHg      Is BP treated: Yes      HDL Cholesterol: 62 mg/dL      Total Cholesterol: 291 mg/dL    Plan:  Continue Crestor 20mg   F/u Lipid panel in 4 months
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
## 2025-01-06 NOTE — ASSESSMENT & PLAN NOTE
Blood Pressure: 142/73, Repeat blood pressure:140/88, goal BP <140/90.   Home blood pressures: 111-139/78-80s  Cr: 0.68, GFR 84, normal kidney function    Plan:  Reduced amlodipine 5mg BID to amlodipine 5mg qd  Continue  Lisinopril 5mg daily   Home blood pressure log   F/u BMP- potassium 5.6 with moderately hemolyzed   ED precautions given    Orders:    Basic metabolic panel; Future    amLODIPine (NORVASC) 5 mg tablet; Take 1 tablet (5 mg total) by mouth daily
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
## 2025-01-09 ENCOUNTER — APPOINTMENT (OUTPATIENT)
Dept: LAB | Facility: CLINIC | Age: 80
End: 2025-01-09
Payer: COMMERCIAL

## 2025-01-09 DIAGNOSIS — I10 PRIMARY HYPERTENSION: ICD-10-CM

## 2025-01-09 LAB
ANION GAP SERPL CALCULATED.3IONS-SCNC: 9 MMOL/L (ref 4–13)
BUN SERPL-MCNC: 22 MG/DL (ref 5–25)
CALCIUM SERPL-MCNC: 10 MG/DL (ref 8.4–10.2)
CHLORIDE SERPL-SCNC: 105 MMOL/L (ref 96–108)
CO2 SERPL-SCNC: 25 MMOL/L (ref 21–32)
CREAT SERPL-MCNC: 0.85 MG/DL (ref 0.6–1.3)
GFR SERPL CREATININE-BSD FRML MDRD: 65 ML/MIN/1.73SQ M
GLUCOSE P FAST SERPL-MCNC: 84 MG/DL (ref 65–99)
POTASSIUM SERPL-SCNC: 4.4 MMOL/L (ref 3.5–5.3)
SODIUM SERPL-SCNC: 139 MMOL/L (ref 135–147)

## 2025-01-09 PROCEDURE — 36415 COLL VENOUS BLD VENIPUNCTURE: CPT

## 2025-01-09 PROCEDURE — 80048 BASIC METABOLIC PNL TOTAL CA: CPT

## 2025-01-10 DIAGNOSIS — I10 PRIMARY HYPERTENSION: ICD-10-CM

## 2025-01-10 RX ORDER — AMLODIPINE BESYLATE 5 MG/1
TABLET ORAL
Qty: 60 TABLET | Refills: 0 | Status: SHIPPED | OUTPATIENT
Start: 2025-01-10

## 2025-01-13 ENCOUNTER — RESULTS FOLLOW-UP (OUTPATIENT)
Dept: ENDOCRINOLOGY | Facility: CLINIC | Age: 80
End: 2025-01-13

## 2025-01-13 NOTE — RESULT ENCOUNTER NOTE
Electrolytes normal. Hemolyzed hyperkalemia improved/normal on repeat lab. Please call patient to let her know that her blood work looked normal! Thank you!

## 2025-01-15 ENCOUNTER — 1 DAY POST-OP (OUTPATIENT)
Dept: URBAN - METROPOLITAN AREA CLINIC 6 | Facility: CLINIC | Age: 80
End: 2025-01-15

## 2025-01-15 DIAGNOSIS — Z96.1: ICD-10-CM

## 2025-01-15 DIAGNOSIS — H25.812: ICD-10-CM

## 2025-01-15 PROCEDURE — 99024 POSTOP FOLLOW-UP VISIT: CPT

## 2025-01-15 PROCEDURE — 92136 OPHTHALMIC BIOMETRY: CPT | Mod: 26,LT

## 2025-01-15 ASSESSMENT — TONOMETRY
OS_IOP_MMHG: 9
OD_IOP_MMHG: 13

## 2025-01-15 ASSESSMENT — KERATOMETRY
OS_K2POWER_DIOPTERS: 45.00
OS_AXISANGLE2_DEGREES: 99
OD_K1POWER_DIOPTERS: 41.50
OD_AXISANGLE2_DEGREES: 75
OS_K1POWER_DIOPTERS: 43.25
OD_AXISANGLE_DEGREES: 165
OD_K2POWER_DIOPTERS: 46.00
OS_AXISANGLE_DEGREES: 9

## 2025-01-15 ASSESSMENT — VISUAL ACUITY
OS_CC: 20/100
OD_PH: 20/70
OS_PH: 20/60
OD_SC: 20/100

## 2025-01-22 ENCOUNTER — 1 WEEK POST-OP (OUTPATIENT)
Dept: URBAN - METROPOLITAN AREA CLINIC 6 | Facility: CLINIC | Age: 80
End: 2025-01-22

## 2025-01-22 DIAGNOSIS — H25.812: ICD-10-CM

## 2025-01-22 DIAGNOSIS — Z96.1: ICD-10-CM

## 2025-01-22 PROCEDURE — 99024 POSTOP FOLLOW-UP VISIT: CPT

## 2025-01-22 ASSESSMENT — TONOMETRY
OS_IOP_MMHG: 9
OD_IOP_MMHG: 11

## 2025-01-22 ASSESSMENT — KERATOMETRY
OD_K2POWER_DIOPTERS: 46.00
OD_AXISANGLE_DEGREES: 165
OS_AXISANGLE_DEGREES: 9
OS_K1POWER_DIOPTERS: 43.25
OS_AXISANGLE2_DEGREES: 99
OD_AXISANGLE2_DEGREES: 75
OD_K1POWER_DIOPTERS: 41.50
OS_K2POWER_DIOPTERS: 45.00

## 2025-01-22 ASSESSMENT — VISUAL ACUITY
OD_SC: 20/100-2
OD_PH: 20/50-2

## 2025-01-23 DIAGNOSIS — I10 PRIMARY HYPERTENSION: ICD-10-CM

## 2025-01-23 RX ORDER — LISINOPRIL 5 MG/1
5 TABLET ORAL DAILY
Qty: 30 TABLET | Refills: 0 | Status: SHIPPED | OUTPATIENT
Start: 2025-01-23

## 2025-01-29 ENCOUNTER — 1 DAY POST-OP (OUTPATIENT)
Dept: URBAN - METROPOLITAN AREA CLINIC 6 | Facility: CLINIC | Age: 80
End: 2025-01-29

## 2025-01-29 DIAGNOSIS — Z96.1: ICD-10-CM

## 2025-01-29 PROCEDURE — 99024 POSTOP FOLLOW-UP VISIT: CPT

## 2025-01-29 ASSESSMENT — KERATOMETRY
OD_AXISANGLE_DEGREES: 165
OD_K1POWER_DIOPTERS: 41.50
OD_K2POWER_DIOPTERS: 46.00
OS_AXISANGLE2_DEGREES: 99
OD_AXISANGLE2_DEGREES: 75
OS_K2POWER_DIOPTERS: 45.00
OS_AXISANGLE_DEGREES: 9
OS_K1POWER_DIOPTERS: 43.25

## 2025-01-29 ASSESSMENT — VISUAL ACUITY
OD_SC: J10
OS_SC: 20/60-2
OD_SC: 20/150+2
OD_PH: 20/60
OS_SC: J16

## 2025-01-29 ASSESSMENT — TONOMETRY
OS_IOP_MMHG: 12
OD_IOP_MMHG: 10

## 2025-01-30 ENCOUNTER — HOSPITAL ENCOUNTER (OUTPATIENT)
Dept: NON INVASIVE DIAGNOSTICS | Facility: CLINIC | Age: 80
Discharge: HOME | End: 2025-01-30
Payer: COMMERCIAL

## 2025-01-30 VITALS
HEART RATE: 83 BPM | DIASTOLIC BLOOD PRESSURE: 73 MMHG | HEIGHT: 60 IN | SYSTOLIC BLOOD PRESSURE: 142 MMHG | WEIGHT: 123 LBS | BODY MASS INDEX: 24.15 KG/M2

## 2025-01-30 DIAGNOSIS — R42 DIZZINESS: ICD-10-CM

## 2025-01-30 DIAGNOSIS — R51.9 PERSISTENT HEADACHES: ICD-10-CM

## 2025-01-30 DIAGNOSIS — H53.2 DIPLOPIA: ICD-10-CM

## 2025-01-30 LAB
AORTIC ROOT: 3.3 CM
ASCENDING AORTA: 3.3 CM
BSA FOR ECHO PROCEDURE: 1.53 M2
DOP CALC MV VTI: 14.53 CM
E WAVE DECELERATION TIME: 201 MS
E/A RATIO: 0.9
FRACTIONAL SHORTENING: 24 (ref 28–44)
INTERVENTRICULAR SEPTUM IN DIASTOLE (PARASTERNAL SHORT AXIS VIEW): 0.8 CM
INTERVENTRICULAR SEPTUM: 0.8 CM (ref 0.6–1.1)
LAAS-AP2: 18.4 CM2
LAAS-AP4: 15.6 CM2
LEFT ATRIUM SIZE: 4.2 CM
LEFT ATRIUM VOLUME (MOD BIPLANE): 48 ML
LEFT ATRIUM VOLUME INDEX (MOD BIPLANE): 31.4 ML/M2
LEFT INTERNAL DIMENSION IN SYSTOLE: 3.4 CM (ref 2.1–4)
LEFT VENTRICULAR INTERNAL DIMENSION IN DIASTOLE: 4.5 CM (ref 3.5–6)
LEFT VENTRICULAR POSTERIOR WALL IN END DIASTOLE: 0.8 CM
LEFT VENTRICULAR STROKE VOLUME: 45 ML
LV EF US.2D.A4C+ESTIMATED: 62 %
LVSV (TEICH): 45 ML
MV E'TISSUE VEL-LAT: 9 CM/S
MV E'TISSUE VEL-SEP: 9 CM/S
MV EROA: 0.1 CM2
MV MEAN GRADIENT: 1 MMHG
MV PEAK A VEL: 0.6 M/S
MV PEAK E VEL: 54 CM/S
MV PEAK GRADIENT: 3 MMHG
MV REGURGITANT VOLUME: 15 ML
MV STENOSIS PRESSURE HALF TIME: 58 MS
MV VALVE AREA P 1/2 METHOD: 3.79
PISA MRMAX VEL: 0.36 M/S
PISA RADIUS: 0.4 CM
RA PRESSURE ESTIMATED: 3 MMHG
RIGHT ATRIUM AREA SYSTOLE A4C: 16.8 CM2
RIGHT VENTRICLE ID DIMENSION: 4.6 CM
RV PSP: 23 MMHG
SL CV LEFT ATRIUM LENGTH A2C: 5.2 CM
SL CV LV EF: 60
SL CV PED ECHO LEFT VENTRICLE DIASTOLIC VOLUME (MOD BIPLANE) 2D: 92 ML
SL CV PED ECHO LEFT VENTRICLE SYSTOLIC VOLUME (MOD BIPLANE) 2D: 47 ML
TR MAX PG: 20 MMHG
TR PEAK VELOCITY: 2.3 M/S
TRICUSPID ANNULAR PLANE SYSTOLIC EXCURSION: 1.5 CM
TRICUSPID VALVE PEAK REGURGITATION VELOCITY: 2.26 M/S

## 2025-01-30 PROCEDURE — 93306 TTE W/DOPPLER COMPLETE: CPT

## 2025-01-30 PROCEDURE — 93306 TTE W/DOPPLER COMPLETE: CPT | Performed by: INTERNAL MEDICINE

## 2025-02-03 ENCOUNTER — RESULTS FOLLOW-UP (OUTPATIENT)
Dept: ENDOCRINOLOGY | Facility: CLINIC | Age: 80
End: 2025-02-03
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
## 2025-02-04 ENCOUNTER — CONSULT (OUTPATIENT)
Dept: NEUROLOGY | Facility: CLINIC | Age: 80
End: 2025-02-04
Payer: COMMERCIAL

## 2025-02-04 VITALS — SYSTOLIC BLOOD PRESSURE: 150 MMHG | HEART RATE: 72 BPM | DIASTOLIC BLOOD PRESSURE: 84 MMHG

## 2025-02-04 DIAGNOSIS — H53.2 DIPLOPIA: ICD-10-CM

## 2025-02-04 DIAGNOSIS — Z86.73 HISTORY OF STROKE: Primary | ICD-10-CM

## 2025-02-04 DIAGNOSIS — R51.9 PERSISTENT HEADACHES: ICD-10-CM

## 2025-02-04 PROCEDURE — 99204 OFFICE O/P NEW MOD 45 MIN: CPT | Performed by: PSYCHIATRY & NEUROLOGY

## 2025-02-04 NOTE — ASSESSMENT & PLAN NOTE
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  Patient will contact office for any further concerns of headache, migraine, or other focal neurologic deficits as discussed in the above problem.    Orders:  •  Ambulatory Referral to Neurology  "

## 2025-02-04 NOTE — ASSESSMENT & PLAN NOTE
Orders:  •  CT spine cervical wo contrast; Future  •  Ambulatory Referral to Physical Therapy; Future

## 2025-02-04 NOTE — PATIENT INSTRUCTIONS
- Please call central scheduling to obtain a CT scan of the neck without contrast  - Please continue to follow with your PCP  - Please continue aspirin and rosuvastatin  - Please see referral for Physical therapy  - Please call for any questions or concerns  - Please follow up with neurology in 6 months

## 2025-02-04 NOTE — ASSESSMENT & PLAN NOTE
Assessment:  Patient is a pleasant 79-year-old female with past medical history of hypercholesterolemia, hypertension, strabismus with associate diplopia, recently completed bilateral cataract repair with follow-up with ophthalmology this week, and history of stroke the presented to the residency neurology clinic for evaluation of abnormal imaging, and discussion of diplopia/headache.  In terms of stroke, most recent noncontrast CT of the head completed in December 2023 demonstrated chronic bilateral anterior ganglial capsular lacunar infarcts with chronic microangiopathic changes, and frontal calvarial osteoma and soft tissue masses of the scalp with calcification.  This CT was completed in the setting of patient having had an accident where she fell backwards, hit her neck and uncertain if head strike occurred, however denied loss of consciousness, and other focal neurologic deficits initially, however subsequently started to experience occasional paresthesias in the back of her neck especially when laying down.  Physical examination demonstrated noticed left-sided strabismus, decreased muscle mass, 4+/5 strength in bilateral knee flexion and extension in the setting of known knee pain for which the patient is being followed by her primary care provider and on aspirin 325 mg daily.  Patient is aware of and understands risks of bleeding, and will contact her primary care doctor when new falls, injuries, or head strike occurs and will seek emergent care if severe headache or other focal neurologic deficits/strokelike symptoms were to present.  Patient is currently maintained on rosuvastatin 20 mg daily along with lisinopril 5 mg daily, and amlodipine 5 mg daily for blood pressure control.    Patient will obtain CT scan of the neck without contrast to further evaluate paresthesia sensations and neck,.    Plan:  - Case discussed with neurologist Dr. Benito  - Please call central scheduling to obtain a CT scan of the  neck without contrast  - Please continue to follow with your PCP  - Please continue aspirin and rosuvastatin  - Please see referral for Physical therapy  - Please call for any questions or concerns  - Please follow up with neurology in 6 months    Orders:  •  CT spine cervical wo contrast; Future  •  Ambulatory Referral to Physical Therapy; Future

## 2025-02-04 NOTE — ASSESSMENT & PLAN NOTE
"Patient has known diplopia in setting of strabismus, patient has had attempted surgeries without resolution.  Patient has had recent bilateral cataract surgeries completed and will reportedly follow-up with her ophthalmologist this week.  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  Patient to continue following with ophthalmology and primary care.    Orders:  •  Ambulatory Referral to Neurology  "

## 2025-02-04 NOTE — PROGRESS NOTES
Name: Doreen Bello      : 1945      MRN: [id number]  Encounter Provider: Arnav Vera DO  Encounter Date: 2025   Encounter department: [facility]  :  Assessment & Plan  History of stroke    Orders:  •  CT spine cervical wo contrast; Future  •  Ambulatory Referral to Physical Therapy; Future    Persistent headaches    Orders:  •  Ambulatory Referral to Neurology    Diplopia    Orders:  •  Ambulatory Referral to Neurology      {Ambulatory Patient Instructions (Optional):71879}    History of Present Illness {?Quick Links Encounters * My Last Note * Last Note in Specialty * Snapshot * Since Last Visit * History :61191}  HPI  Review of Systems   Constitutional:  Negative for appetite change, fatigue and fever.   HENT: Negative.  Negative for hearing loss, tinnitus, trouble swallowing and voice change.    Eyes: Negative.  Negative for photophobia, pain and visual disturbance.   Respiratory: Negative.  Negative for shortness of breath.    Cardiovascular: Negative.  Negative for palpitations.   Gastrointestinal: Negative.  Negative for nausea and vomiting.   Endocrine: Negative.  Negative for cold intolerance.   Genitourinary: Negative.  Negative for dysuria, frequency and urgency.   Musculoskeletal:  Negative for back pain, gait problem, myalgias, neck pain and neck stiffness.   Skin: Negative.  Negative for rash.   Allergic/Immunologic: Negative.    Neurological: Negative.  Negative for dizziness, tremors, seizures, syncope, facial asymmetry, speech difficulty, weakness, light-headedness, numbness and headaches.        Patient states had an episode where she felt like she was going to black out. Patient states she has been experiencing her body shifting to the side.    Hematological: Negative.  Does not bruise/bleed easily.   Psychiatric/Behavioral: Negative.  Negative for confusion, hallucinations and sleep disturbance.     I have personally reviewed the MA's review of systems  and made changes as necessary.    {Select to insert medical history sections (Optional):41487}     Objective {?Quick Links Trend Vitals * Enter New Vitals * Results Review * Timeline (Adult) * Labs * Imaging * Cardiology * Procedures * Lung Cancer Screening * Surgical eConsent :62966}  /84 (BP Location: Left arm, Patient Position: Sitting, Cuff Size: Large)   Pulse 72     Physical Exam  Neurological Exam    {Radiology Results Review (Optional):91822}    {Administrative / Billing Section (Optional):80072}

## 2025-02-05 ENCOUNTER — 1 WEEK POST-OP (OUTPATIENT)
Dept: URBAN - METROPOLITAN AREA CLINIC 6 | Facility: CLINIC | Age: 80
End: 2025-02-05

## 2025-02-05 DIAGNOSIS — Z96.1: ICD-10-CM

## 2025-02-05 PROCEDURE — 99024 POSTOP FOLLOW-UP VISIT: CPT

## 2025-02-05 ASSESSMENT — VISUAL ACUITY
OS_PH: 20/30-1
OS_SC: 20/60+2
OD_PH: 20/60
OD_SC: 20/100

## 2025-02-05 ASSESSMENT — TONOMETRY
OD_IOP_MMHG: 11
OS_IOP_MMHG: 10

## 2025-02-05 ASSESSMENT — KERATOMETRY
OD_AXISANGLE_DEGREES: 165
OD_K2POWER_DIOPTERS: 46.00
OS_AXISANGLE2_DEGREES: 99
OD_AXISANGLE2_DEGREES: 75
OD_K1POWER_DIOPTERS: 41.50
OS_K2POWER_DIOPTERS: 45.00
OS_AXISANGLE_DEGREES: 9
OS_K1POWER_DIOPTERS: 43.25

## 2025-02-07 DIAGNOSIS — I10 PRIMARY HYPERTENSION: ICD-10-CM

## 2025-02-07 RX ORDER — AMLODIPINE BESYLATE 5 MG/1
TABLET ORAL
Qty: 60 TABLET | Refills: 5 | Status: SHIPPED | OUTPATIENT
Start: 2025-02-07 | End: 2025-02-11

## 2025-02-10 ENCOUNTER — RA CDI HCC (OUTPATIENT)
Dept: OTHER | Facility: HOSPITAL | Age: 80
End: 2025-02-10

## 2025-02-11 ENCOUNTER — OFFICE VISIT (OUTPATIENT)
Dept: FAMILY MEDICINE CLINIC | Facility: CLINIC | Age: 80
End: 2025-02-11
Payer: COMMERCIAL

## 2025-02-11 VITALS
DIASTOLIC BLOOD PRESSURE: 82 MMHG | SYSTOLIC BLOOD PRESSURE: 142 MMHG | HEART RATE: 88 BPM | TEMPERATURE: 97.2 F | WEIGHT: 111 LBS | HEIGHT: 60 IN | BODY MASS INDEX: 21.79 KG/M2 | OXYGEN SATURATION: 97 %

## 2025-02-11 DIAGNOSIS — E78.00 PURE HYPERCHOLESTEROLEMIA: ICD-10-CM

## 2025-02-11 DIAGNOSIS — I10 PRIMARY HYPERTENSION: Primary | ICD-10-CM

## 2025-02-11 DIAGNOSIS — M54.2 NECK PAIN: ICD-10-CM

## 2025-02-11 DIAGNOSIS — E55.9 VITAMIN D DEFICIENCY: ICD-10-CM

## 2025-02-11 PROCEDURE — G2211 COMPLEX E/M VISIT ADD ON: HCPCS

## 2025-02-11 PROCEDURE — 99213 OFFICE O/P EST LOW 20 MIN: CPT

## 2025-02-11 RX ORDER — LISINOPRIL 5 MG/1
5 TABLET ORAL DAILY
Qty: 90 TABLET | Refills: 1 | Status: SHIPPED | OUTPATIENT
Start: 2025-02-11

## 2025-02-11 RX ORDER — ROSUVASTATIN CALCIUM 20 MG/1
20 TABLET, COATED ORAL DAILY
Qty: 90 TABLET | Refills: 1 | Status: SHIPPED | OUTPATIENT
Start: 2025-02-11

## 2025-02-11 RX ORDER — AMLODIPINE BESYLATE 5 MG/1
5 TABLET ORAL DAILY
Start: 2025-02-11

## 2025-02-11 NOTE — PROGRESS NOTES
Name: Doreen Bello      : 1945      MRN: [id number]  Encounter Provider: Jennifer Smith DO  Encounter Date: 2025   [facility]  :  Assessment & Plan  Primary hypertension  Blood Pressure: 142/82,  goal BP <140/90.   Home blood pressures: 111-139/78-80s  Cr: 0.68, GFR 84, normal kidney function    Plan:  Continue amlodipine 5mg daily   Continue  Lisinopril 5mg daily   Home blood pressure log    ED precautions given        Orders:    amLODIPine (NORVASC) 5 mg tablet; Take 1 tablet (5 mg total) by mouth daily    lisinopril (ZESTRIL) 5 mg tablet; Take 1 tablet (5 mg total) by mouth daily    rosuvastatin (CRESTOR) 20 MG tablet; Take 1 tablet (20 mg total) by mouth daily    Pure hypercholesterolemia  Lab Results   Component Value Date    CHOLESTEROL 291 (H) 2024    TRIG 138 2024    HDL 62 2024    LDLCALC 201 (H) 2024     The 10-year ASCVD risk score (Clarence RENDON, et al., 2019) is: 33.8%    Values used to calculate the score:      Age: 79 years      Sex: Female      Is Non- : No      Diabetic: No      Tobacco smoker: No      Systolic Blood Pressure: 142 mmHg      Is BP treated: Yes      HDL Cholesterol: 62 mg/dL      Total Cholesterol: 291 mg/dL  Plan:  Continue Crestor 20mg     Orders:    rosuvastatin (CRESTOR) 20 MG tablet; Take 1 tablet (20 mg total) by mouth daily    Vitamin D deficiency  Vitamin D level 20  Continue vitamin D 1000U daily         Orders:    Cholecalciferol (VITAMIN D3) 1,000 units tablet; Take 1 tablet (1,000 Units total) by mouth daily    Neck pain  History of trauma that resulted in back on head strike, No LOC on 2024, with history of multiple concussions in the past  CXR: Mild anterolisthesis of C3 on C4. Slight anterolisthesis of C4 on C5. Multilevel degenerative facet hypertrophy and disc space narrowing in the mid to lower cervical spine.  Continues to endorse neck  pain , mainly muscle pain  No red flag symptoms      Plan:  F/u CT cervical spine per neurology  Encouraged PT   Continue to monitor   Consider OMT  ED precautions given          Nutrition Assessment and Intervention:     New Nutrition Prescription completed with patient      Other interventions: Recommend serving of nuts per day       History of Present Illness     HPI  80 yo female with pmhx including HTN, HLD, anterolisthesis of cervical spine, history of lacunar stroke, multiple concussions, history of perpherial vision lost 2/2 to diplopia and strabismus release in 1970s,  vitamin D deficiency present to the office for follow-up for hypertension. Reports dizziness has completely resolved, last episode was during her marathon on October 23.      She established with neurology for diplopia and history of stroke.  Neurology ordered CT cervical spine due to persistent neck pain after injury. Notes pressure and pain with her neck,worse at night time when she sleeps on it. S/p bilateral cataract surgery. Still  has unchanged diplopia but notes vision is less clear and not yellow. Still needs to get updated prisms. Hard to see up close things. Gets headaches due to needing glasses. Has follow up in March.     Initial blood pressure was 153/85 with the machine, her manual blood pressure was  142/82.  Reports home blood pressures range from 133-140s/81-89s in the morning and then in the afternoons 123-126/81-89.    Still taking amlodipine 5mg and lisinopril 5mg. Doesn't use added salt. No lunch meats. No cans. Raw spinach, nuts. Not a big meat eater. Usually chicken and fish. Drinks coffee in the morning, tea during lunch and bedtime time. Takes Advil takes once per day. Takes aspirin once per day. Has bilateral knee arthritis. Occasionally gives out due to pain. Has crutches. She is not interested knee replacement. No tobacco .    Reports still being very active. Denies any dizziness, lightheadedness, chest pain, SOB,  leg swelling.     Reviewed Echo:   LVEF 60%, normal systolic and diastolic dysfunction. Dilated R ventricle and moderately reduced right ventricular systolic function, dilated R. Atrium. Aortic valve sclerosis, mild mitral regurg.     Review of Systems   Constitutional:  Negative for diaphoresis and fever.   Eyes:  Negative for visual disturbance (no change to baseline diplopia).   Respiratory:  Negative for shortness of breath and wheezing.    Cardiovascular:  Negative for chest pain, palpitations and leg swelling.   Gastrointestinal:  Negative for abdominal pain, nausea and vomiting.   Musculoskeletal:  Positive for neck pain.   Neurological:  Positive for headaches. Negative for dizziness, syncope and light-headedness.     Medical History Reviewed by provider this encounter:     .  Past Medical History   Past Medical History:   Diagnosis Date    Anterolisthesis of cervical spine 11/22/2024    Concussion     Diplopia     History of stroke 11/22/2024    Primary hypertension 11/22/2024    Pure hypercholesterolemia 11/22/2024    Strabismus 01/06/2025    Vitamin D deficiency 11/22/2024     Past Surgical History:   Procedure Laterality Date    KNEE SURGERY Left     in 1996 x2 (had revision within 6 months of each other)    STRABISMUS SURGERY Bilateral     1970s    TONSILLECTOMY AND ADENOIDECTOMY      at 6yo     History reviewed. No pertinent family history.   reports that she has never smoked. She has never used smokeless tobacco. She reports current alcohol use. She reports that she does not use drugs.  Current Outpatient Medications on File Prior to Visit   Medication Sig Dispense Refill    aspirin (ECOTRIN) 325 mg EC tablet Take 325 mg by mouth daily       No current facility-administered medications on file prior to visit.     Allergies   Allergen Reactions    Penicillins Rash    Bee Venom Other (See Comments)     Excessive local reaction    Medical Tape Rash    Tomato - Food Allergy Rash      Current Outpatient  Medications on File Prior to Visit   Medication Sig Dispense Refill    aspirin (ECOTRIN) 325 mg EC tablet Take 325 mg by mouth daily       No current facility-administered medications on file prior to visit.      Social History     Tobacco Use    Smoking status: Never    Smokeless tobacco: Never   Vaping Use    Vaping status: Never Used   Substance and Sexual Activity    Alcohol use: Yes     Comment: ocassionally    Drug use: Never    Sexual activity: Not on file        Objective   /82 (BP Location: Left arm, Patient Position: Sitting, Cuff Size: Standard)   Pulse 88   Temp (!) 97.2 °F (36.2 °C) (Temporal)   Ht 5' (1.524 m)   Wt 50.3 kg (111 lb)   SpO2 97%   BMI 21.68 kg/m²      Physical Exam  Vitals and nursing note reviewed.   Constitutional:       General: She is not in acute distress.     Appearance: She is well-developed.   HENT:      Head: Normocephalic and atraumatic.   Cardiovascular:      Rate and Rhythm: Normal rate and regular rhythm.      Heart sounds: No murmur heard.  Pulmonary:      Effort: Pulmonary effort is normal. No respiratory distress.      Breath sounds: Normal breath sounds. No stridor. No wheezing or rales.   Abdominal:      General: Bowel sounds are normal. There is no distension.      Palpations: Abdomen is soft.      Tenderness: There is no abdominal tenderness. There is no guarding.   Musculoskeletal:         General: No swelling.      Cervical back: Neck supple.      Right lower leg: No edema.      Left lower leg: No edema.   Skin:     General: Skin is warm and dry.      Capillary Refill: Capillary refill takes less than 2 seconds.   Neurological:      Mental Status: She is alert.   Psychiatric:         Mood and Affect: Mood normal.

## 2025-02-11 NOTE — PATIENT INSTRUCTIONS
Continue amlodipine 5mg daily   Continue  Lisinopril 5mg daily   Continue Crestor 20mg daily  Continue Vitamin D 1000U-2000U daily     Patient Education     Neck Pain Exercises   About this topic   The neck or cervical spine has 7 spinal bones that run from the base of your skull to the upper back. These spinal bones have discs in between them. Discs act as shock absorbers. Ligaments are strong bands of tissue that hold the bones together. Many muscles surround and attach on these bones. Nerves come off of the spinal cord and exit out of small spaces in between the spinal bones. You can have neck pain if any of these are injured or damaged. Exercises may help to make this problem better.  General   Before starting with a program, ask your doctor if you are healthy enough to do these exercises. Your doctor may have you work with a  or physical therapist to make a safe exercise program to meet your needs. You should not do the exercises if they cause sharp pains, if you feel dizzy, or if you have vision changes.  Stretching Exercises   Stretching exercises keep your muscles flexible. They also stop them from getting tight. Start by doing each of these stretches 2 to 3 times. In order for your body to make changes, you will need to hold these stretches for 20 to 30 seconds. Try to do the stretches 2 to 3 times each day. Do all exercises slowly.  Passive neck stretches:  Put your left hand on top of your head. Your other arm can be at your side or behind your back. Pull your head toward your left shoulder until you feel a gentle stretch on the right side of your neck. Repeat on the other side using your other hand.  Also, try this stretch by pulling in a diagonal direction. With your left hand on top of your head, pull your head down towards the direction of your left knee. You should feel this stretch toward the back on the right side of your neck. Repeat on the other side.  Active neck stretches:  Neck  front-to-back motion ? Look down to the floor until you feel a stretch in the back of your neck. Hold. Next, look up to the ceiling until you feel a stretch in the front of your neck. Hold.  Neck side-to-side motion ? Tilt your head to the side and bring your ear to your shoulder until you feel a stretch on the other side of your neck. Hold. Next, tilt your head to the other side until you feel a stretch. Hold.  Neck turning ? Turn only your head and look over your left shoulder until you feel stretching in the right side of your neck. Hold. Now turn only your head and look over your right shoulder until you feel a stretch in the left side of your neck. Hold.  Scalene stretches ? Grasp your head with the hand opposite the side you want to stretch. Pull your head to the side until you feel a stretch. Now, slowly turn your head so your chin is pointed upwards.  Chin tucks ? Stand straight or lie down on your back. Tuck your chin in and lengthen the back of your neck. Return to the starting position and repeat. It may help to stand up against a wall during this exercise. Try gently pushing your chin with two fingers while trying to flatten your neck against the wall. If you do this exercise lying down, try using a small rolled up washcloth under your neck. Push down into the washcloth when tucking in your chin.  Strengthening Exercises   Strengthening exercises keep your muscles firm and strong. Start by repeating each exercise 2 to 3 times. Work up to doing each exercise 10 times. Try to do the exercises 2 to 3 times each day. Hold each exercise for 3 to 5 seconds. Do all exercises slowly.  Shoulder blade squeezes ? Pinch your shoulder blades together on your upper back and hold 3 to 5 seconds. Relax.             What will the results be?   Less pain and stiffness  Better range of motion  Increased strength  Help you heal faster after an injury or surgery  Increase blood flow to a body part  Help you feel better and  more relaxed  Give you more energy  More toned looking muscles  Better posture  Easier to do daily activities  Helpful tips   Stay active and work out to keep your muscles strong and flexible.  Be sure you do not hold your breath when exercising. This can raise your blood pressure. If you tend to hold your breath, try counting out loud when exercising. If any exercise bothers you, stop right away.  Try swinging your arms at an easy pace for a few minutes to warm up your muscles. Do this again after exercising.  Doing exercises before a meal may be a good way to get into a routine.  Exercise may be slightly uncomfortable, but you should not have sharp pains. If you do get sharp pains, stop what you are doing. If the sharp pains continue, call your doctor.  Last Reviewed Date   2020-03-10  Consumer Information Use and Disclaimer   This generalized information is a limited summary of diagnosis, treatment, and/or medication information. It is not meant to be comprehensive and should be used as a tool to help the user understand and/or assess potential diagnostic and treatment options. It does NOT include all information about conditions, treatments, medications, side effects, or risks that may apply to a specific patient. It is not intended to be medical advice or a substitute for the medical advice, diagnosis, or treatment of a health care provider based on the health care provider's examination and assessment of a patient’s specific and unique circumstances. Patients must speak with a health care provider for complete information about their health, medical questions, and treatment options, including any risks or benefits regarding use of medications. This information does not endorse any treatments or medications as safe, effective, or approved for treating a specific patient. [employer] and its affiliates disclaim any warranty or liability relating to this information or the use thereof. The use of this  information is governed by the Terms of Use, available at https://www.wolterskluwer.com/en/know/clinical-effectiveness-terms   Copyright   Copyright © 2024 [employer] and its affiliates and/or licensors. All rights reserved.

## 2025-02-11 NOTE — ASSESSMENT & PLAN NOTE
Vitamin D level 20  Continue vitamin D 1000U daily         Orders:    Cholecalciferol (VITAMIN D3) 1,000 units tablet; Take 1 tablet (1,000 Units total) by mouth daily

## 2025-02-11 NOTE — ASSESSMENT & PLAN NOTE
Blood Pressure: 142/82,  goal BP <140/90.   Home blood pressures: 111-139/78-80s  Cr: 0.68, GFR 84, normal kidney function    Plan:  Continue amlodipine 5mg daily   Continue  Lisinopril 5mg daily   Home blood pressure log    ED precautions given        Orders:    amLODIPine (NORVASC) 5 mg tablet; Take 1 tablet (5 mg total) by mouth daily    lisinopril (ZESTRIL) 5 mg tablet; Take 1 tablet (5 mg total) by mouth daily    rosuvastatin (CRESTOR) 20 MG tablet; Take 1 tablet (20 mg total) by mouth daily

## 2025-02-11 NOTE — ASSESSMENT & PLAN NOTE
Lab Results   Component Value Date    CHOLESTEROL 291 (H) 11/21/2024    TRIG 138 11/21/2024    HDL 62 11/21/2024    LDLCALC 201 (H) 11/21/2024     The 10-year ASCVD risk score (Clarence RENDON, et al., 2019) is: 33.8%    Values used to calculate the score:      Age: 79 years      Sex: Female      Is Non- : No      Diabetic: No      Tobacco smoker: No      Systolic Blood Pressure: 142 mmHg      Is BP treated: Yes      HDL Cholesterol: 62 mg/dL      Total Cholesterol: 291 mg/dL  Plan:  Continue Crestor 20mg     Orders:    rosuvastatin (CRESTOR) 20 MG tablet; Take 1 tablet (20 mg total) by mouth daily

## 2025-02-21 ENCOUNTER — HOSPITAL ENCOUNTER (OUTPATIENT)
Facility: HOSPITAL | Age: 80
Discharge: HOME | End: 2025-02-21
Payer: COMMERCIAL

## 2025-02-21 ENCOUNTER — HOSPITAL ENCOUNTER (OUTPATIENT)
Dept: CT IMAGING | Facility: HOSPITAL | Age: 80
End: 2025-02-21
Payer: COMMERCIAL

## 2025-02-21 VITALS — WEIGHT: 117 LBS | HEIGHT: 60 IN | BODY MASS INDEX: 22.97 KG/M2

## 2025-02-21 DIAGNOSIS — Z86.73 HISTORY OF STROKE: ICD-10-CM

## 2025-02-21 DIAGNOSIS — Z78.0 POST-MENOPAUSAL: ICD-10-CM

## 2025-02-21 PROCEDURE — 77080 DXA BONE DENSITY AXIAL: CPT

## 2025-03-03 ENCOUNTER — RESULTS FOLLOW-UP (OUTPATIENT)
Dept: NEUROLOGY | Facility: CLINIC | Age: 80
End: 2025-03-03

## 2025-03-03 NOTE — TELEPHONE ENCOUNTER
This writer reached out to the patient today in regards to the results of the CT cervical spine without contrast. The below results were discussed with the patient, the patient will now call to schedule physical therapy as she was waiting for the results to be reported first.  Patient also reported that she will follow-up with her primary care doctor in regards to arthritis and osteopenia.  Patient reported that a physical therapy does not provide her with some relief, that she will be considering referral to spine/pain medicine if needed.  Patient had no further questions or concerns for the neurology team at end of phone call.    Impression:  Extensive multilevel degenerative changes with central and neuroforaminal stenosis throughout the cervical spine as described in full report.  There is mild diffuse osteopenia.  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

## 2025-03-14 ENCOUNTER — POST-OP CHECK (OUTPATIENT)
Dept: URBAN - METROPOLITAN AREA CLINIC 6 | Facility: CLINIC | Age: 80
End: 2025-03-14

## 2025-03-14 DIAGNOSIS — H52.13: ICD-10-CM

## 2025-03-14 DIAGNOSIS — Z96.1: ICD-10-CM

## 2025-03-14 PROCEDURE — 99024 POSTOP FOLLOW-UP VISIT: CPT

## 2025-03-14 PROCEDURE — 92015 DETERMINE REFRACTIVE STATE: CPT

## 2025-03-14 PROCEDURE — 92060 SENSORIMOTOR EXAMINATION: CPT

## 2025-03-14 ASSESSMENT — VISUAL ACUITY
OS_SC: 20/50
OD_SC: 20/70-2

## 2025-03-14 ASSESSMENT — TONOMETRY
OD_IOP_MMHG: 7
OS_IOP_MMHG: 8

## 2025-03-14 ASSESSMENT — KERATOMETRY
OD_AXISANGLE_DEGREES: 165
OS_K1POWER_DIOPTERS: 43.25
OD_K1POWER_DIOPTERS: 41.50
OS_AXISANGLE2_DEGREES: 99
OS_AXISANGLE_DEGREES: 9
OD_K2POWER_DIOPTERS: 46.00
OD_AXISANGLE2_DEGREES: 75
OS_K2POWER_DIOPTERS: 45.00

## 2025-04-09 ENCOUNTER — RESULTS FOLLOW-UP (OUTPATIENT)
Dept: UROLOGY | Facility: CLINIC | Age: 80
End: 2025-04-09

## 2025-04-09 DIAGNOSIS — M81.0 OSTEOPOROSIS, UNSPECIFIED OSTEOPOROSIS TYPE, UNSPECIFIED PATHOLOGICAL FRACTURE PRESENCE: Primary | ICD-10-CM

## 2025-04-09 NOTE — TELEPHONE ENCOUNTER
Called pt to relay . Message, left a voice note with a call back number.  message written below    ----- Message from Jennifer Smith DO sent at 4/9/2025  3:16 PM EDT -----  Osteoporosis. Can you call the patient and please schedule an office visit to review her dexa scan. Please tell her I would like her to get additional blood work including: Vitamin D level, Phosphate, PTH, and 24 hour urine calcium and creatinine, prior to the office visit. With the urine samples, she should  the canisters at the lab. She should not collect the first void of the day, collect all voids and then collect the first void of the following day.

## 2025-04-10 NOTE — TELEPHONE ENCOUNTER
Pt returned call; relayed PCP message and pt verbalized understanding, she stated she has lots of calcification/scarring since she was young but she will still get bloodwork completed. Scheduled appt w/ Dr. Smith to further discuss on 4/25.

## 2025-04-16 ENCOUNTER — APPOINTMENT (OUTPATIENT)
Dept: LAB | Facility: CLINIC | Age: 80
End: 2025-04-16
Payer: COMMERCIAL

## 2025-04-16 DIAGNOSIS — M81.0 OSTEOPOROSIS, UNSPECIFIED OSTEOPOROSIS TYPE, UNSPECIFIED PATHOLOGICAL FRACTURE PRESENCE: ICD-10-CM

## 2025-04-16 LAB
25(OH)D3 SERPL-MCNC: 33.1 NG/ML (ref 30–100)
PHOSPHATE SERPL-MCNC: 2.9 MG/DL (ref 2.3–4.1)
PTH-INTACT SERPL-MCNC: 74.7 PG/ML (ref 12–88)

## 2025-04-16 PROCEDURE — 84100 ASSAY OF PHOSPHORUS: CPT

## 2025-04-16 PROCEDURE — 36415 COLL VENOUS BLD VENIPUNCTURE: CPT

## 2025-04-16 PROCEDURE — 83970 ASSAY OF PARATHORMONE: CPT

## 2025-04-16 PROCEDURE — 82306 VITAMIN D 25 HYDROXY: CPT

## 2025-04-18 ENCOUNTER — APPOINTMENT (OUTPATIENT)
Dept: LAB | Facility: CLINIC | Age: 80
End: 2025-04-18
Payer: COMMERCIAL

## 2025-04-18 LAB
CALCIUM 24H UR-MCNC: 174.5 MG/24 HRS (ref 100–300)
CREAT 24H UR-MRATE: 0.6 G/24HR (ref 0.6–1.8)
SPECIMEN VOL UR: 500 ML
SPECIMEN VOL UR: 500 ML

## 2025-04-18 PROCEDURE — 82340 ASSAY OF CALCIUM IN URINE: CPT

## 2025-04-18 PROCEDURE — 82570 ASSAY OF URINE CREATININE: CPT

## 2025-04-21 ENCOUNTER — RESULTS FOLLOW-UP (OUTPATIENT)
Dept: FAMILY MEDICINE CLINIC | Facility: CLINIC | Age: 80
End: 2025-04-21

## 2025-05-02 ENCOUNTER — OFFICE VISIT (OUTPATIENT)
Dept: FAMILY MEDICINE CLINIC | Facility: CLINIC | Age: 80
End: 2025-05-02
Payer: COMMERCIAL

## 2025-05-02 VITALS
TEMPERATURE: 97.9 F | HEIGHT: 60 IN | BODY MASS INDEX: 23.16 KG/M2 | DIASTOLIC BLOOD PRESSURE: 70 MMHG | SYSTOLIC BLOOD PRESSURE: 121 MMHG | OXYGEN SATURATION: 97 % | WEIGHT: 118 LBS | HEART RATE: 80 BPM

## 2025-05-02 DIAGNOSIS — E55.9 VITAMIN D DEFICIENCY: ICD-10-CM

## 2025-05-02 DIAGNOSIS — I10 PRIMARY HYPERTENSION: ICD-10-CM

## 2025-05-02 DIAGNOSIS — M81.0 AGE RELATED OSTEOPOROSIS, UNSPECIFIED PATHOLOGICAL FRACTURE PRESENCE: Primary | ICD-10-CM

## 2025-05-02 PROCEDURE — 99213 OFFICE O/P EST LOW 20 MIN: CPT

## 2025-05-02 PROCEDURE — G2211 COMPLEX E/M VISIT ADD ON: HCPCS

## 2025-05-02 RX ORDER — ALENDRONATE SODIUM 70 MG/1
70 TABLET ORAL
Qty: 12 TABLET | Refills: 3 | Status: SHIPPED | OUTPATIENT
Start: 2025-05-02

## 2025-05-02 NOTE — ASSESSMENT & PLAN NOTE
Blood Pressure: 121/70,  goal BP <140/90.   Home blood pressures: 128-139/70-80s  Cr: 0.68, GFR 84, normal kidney function  Reports dizziness until 2-3pm, takes blood pressure medications at night.     Plan:  Stop amlodipine 5mg daily   Continue  Lisinopril 5mg daily, consider increasing to lisinopril 10 mg if blood pressure greater than 140/90  Home blood pressure log    Consider adding meclizine 12.5 mg as needed for dizziness, suspect possible vertigo  ED precautions given

## 2025-05-02 NOTE — PATIENT INSTRUCTIONS
STOP taking amlodipine 5mg daily  Continue Lisinopril 5mg at night  Start taking aldendronate 70mg weekly. Take this medications during the day, and stay upright for at least 4 hours after taking it  Continue Vitamin D 1000U daily   Continue Aspirin 325mg daily   Continue rosuvastatin 20mg nightly   Consider using Coenzyme Q-10 for leg pain

## 2025-05-02 NOTE — ASSESSMENT & PLAN NOTE
Dexa: Osteoporosis. Left total hip T-score: -2.5, Left femoral neck: -3.1, 10 year risk of hip fracture is 9.0% with the 10 year risk of major osteoporotic fracture being 22%   RF: normal weight, family history in maternal cousin  PTH normal, Vit D 33, Urine calcium normal, phos normal, kidney function normal.    Plan:  Start Fosamax 70mg weekly   Continue Vitamin D and calcium supplementation  Continue weight bearing exercise  Orders:    alendronate (Fosamax) 70 mg tablet; Take 1 tablet (70 mg total) by mouth every 7 days

## 2025-05-02 NOTE — PROGRESS NOTES
Name: Doreen Bello      : 1945      MRN: [id number]  Encounter Provider: Jennifer Smith DO  Encounter Date: 2025   Encounter department: [facility]  :  Assessment & Plan  Age related osteoporosis, unspecified pathological fracture presence  Dexa: Osteoporosis. Left total hip T-score: -2.5, Left femoral neck: -3.1, 10 year risk of hip fracture is 9.0% with the 10 year risk of major osteoporotic fracture being 22%   RF: normal weight, family history in maternal cousin  PTH normal, Vit D 33, Urine calcium normal, phos normal, kidney function normal.    Plan:  Start Fosamax 70mg weekly   Continue Vitamin D and calcium supplementation  Continue weight bearing exercise  Orders:    alendronate (Fosamax) 70 mg tablet; Take 1 tablet (70 mg total) by mouth every 7 days    Primary hypertension  Blood Pressure: 121/70,  goal BP <140/90.   Home blood pressures: 128-139/70-80s  Cr: 0.68, GFR 84, normal kidney function  Reports dizziness until 2-3pm, takes blood pressure medications at night.     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     At home your bloods 128/80s-130s/80s.  She has been taking amlodipine 5 mg and lisinopril 5 mg before bedtime. Feels lightheaded and dizzy, which resolves around 2-3pm.   "Reports dizziness as room spinning.  Denies any nausea or vomiting.  Patient declined trialing meclizine.  She feels that her blood pressure medications is what is causing the dizziness.  After joint discussion, patient is agreeable to continue lisinopril 5 mg and stop amlodipine 5 mg.    Reports bilateral leg cramping, that she attributes to being very.  Discussed possibly starting coenzyme Q-10, however patient would like to hold off on starting medication    Review of Systems   Constitutional:  Negative for chills, diaphoresis and fever.   Eyes:  Positive for visual disturbance (At baseline).   Respiratory:  Negative for shortness of breath and wheezing.    Cardiovascular:  Negative for chest pain, palpitations and leg swelling.   Gastrointestinal:  Negative for abdominal pain, nausea and vomiting.   Neurological:  Positive for dizziness and light-headedness. Negative for headaches.       Objective   /70 (BP Location: Left arm, Patient Position: Sitting, Cuff Size: Standard)   Pulse 80   Temp 97.9 °F (36.6 °C) (Temporal)   Ht 4' 11.5\" (1.511 m)   Wt 53.5 kg (118 lb)   SpO2 97%   BMI 23.43 kg/m²      Physical Exam  Vitals and nursing note reviewed.   Constitutional:       General: She is not in acute distress.     Appearance: She is well-developed.   HENT:      Head: Normocephalic and atraumatic.   Eyes:      Conjunctiva/sclera: Conjunctivae normal.   Neck:      Vascular: No carotid bruit.   Cardiovascular:      Rate and Rhythm: Normal rate and regular rhythm.      Heart sounds: Murmur heard.   Pulmonary:      Effort: Pulmonary effort is normal. No respiratory distress.      Breath sounds: Normal breath sounds. No stridor. No wheezing, rhonchi or rales.   Chest:      Chest wall: No tenderness.   Abdominal:      General: There is no distension.      Palpations: Abdomen is soft. There is no mass.      Tenderness: There is no abdominal tenderness. There is no guarding or rebound.      Hernia: No hernia is " present.   Musculoskeletal:         General: No swelling.      Cervical back: Neck supple.      Right lower leg: No edema.      Left lower leg: No edema.   Skin:     General: Skin is warm and dry.      Capillary Refill: Capillary refill takes less than 2 seconds.   Neurological:      Mental Status: She is alert.   Psychiatric:         Mood and Affect: Mood normal.

## 2025-05-16 ENCOUNTER — OFFICE VISIT (OUTPATIENT)
Dept: FAMILY MEDICINE CLINIC | Facility: CLINIC | Age: 80
End: 2025-05-16

## 2025-05-16 VITALS
DIASTOLIC BLOOD PRESSURE: 81 MMHG | TEMPERATURE: 97.3 F | SYSTOLIC BLOOD PRESSURE: 149 MMHG | WEIGHT: 117 LBS | HEIGHT: 60 IN | BODY MASS INDEX: 22.97 KG/M2 | HEART RATE: 72 BPM | OXYGEN SATURATION: 96 %

## 2025-05-16 DIAGNOSIS — Z12.11 SCREENING FOR COLON CANCER: ICD-10-CM

## 2025-05-16 DIAGNOSIS — I10 PRIMARY HYPERTENSION: ICD-10-CM

## 2025-05-16 DIAGNOSIS — Z00.00 MEDICARE ANNUAL WELLNESS VISIT, INITIAL: Primary | ICD-10-CM

## 2025-05-16 DIAGNOSIS — E55.9 VITAMIN D DEFICIENCY: ICD-10-CM

## 2025-05-16 DIAGNOSIS — M81.0 AGE-RELATED OSTEOPOROSIS WITHOUT CURRENT PATHOLOGICAL FRACTURE: ICD-10-CM

## 2025-05-16 DIAGNOSIS — Z12.31 ENCOUNTER FOR SCREENING MAMMOGRAM FOR BREAST CANCER: ICD-10-CM

## 2025-05-16 NOTE — PROGRESS NOTES
Name: Doreen Bello      : 1945      MRN: [id number]  Encounter Provider: Jennifer Smith DO  Encounter Date: 2025   Encounter department: [facility]    :  Assessment & Plan  Medicare annual wellness visit, initial  Preventative Blood work  Lipid panel- 24: , T, HDL: 62, LDL: 201. On Crestor 20mg, due for lipid in   CMP for diabetes-25: FB, cr: 0.85, GFR: 65  HIV- non-reactive 24  Hep C- non-reactive 24  Preventative Screenings:  Mammo-mammogram ordered due to how healthy patient is for her age  Colon cancer screening -Cologuard ordered due to how healthy patient is for her age  Dexa-25 osteoporosis, due for repeat DEXA on 27. Currently started on Fosamax 70mg weekly on 25  Depression Screening- negative  Counseling  Continue Vitamin D 2000U  Immunizations  Recommend getting Prevnar 20, Shingles, Tdap at pharmacy   Follow up in 6 months for chronic follow up: HTN, Osteoporosis        Primary hypertension  Blood Pressure: 149/81,  goal BP <140/90.   Home blood pressures: 128-139/70-80s  Cr: 0.85, GFR: 65, normal kidney function  Reports dizziness has resolved after stopping amlodipine.     Plan:  Continue  Lisinopril 5mg daily, consider increasing to lisinopril 10 mg if blood pressure greater than 140/90  Home blood pressure log    ED precautions given           Age-related osteoporosis without current pathological fracture  Dexa: Osteoporosis. Left total hip T-score: -2.5, Left femoral neck: -3.1, 10 year risk of hip fracture is 9.0% with the 10 year risk of major osteoporotic fracture being 22%   RF: normal weight, family history in maternal cousin  PTH normal, Vit D 33, Urine calcium normal, phos normal, kidney function normal.  Fosamax initiated in May 2025    Plan:  Continue Fosamax 70mg weekly   Continue Vitamin D and calcium supplementation  Continue weight bearing exercise       Vitamin D  deficiency  Vitamin D level 33, history of osteoporosis  Continue vitamin D 2000U daily                Encounter for screening mammogram for breast cancer    Orders:  •  Mammo screening bilateral w 3d and cad; Future    Screening for colon cancer    Orders:  •  Cologuard          Depression Screening and Follow-up Plan: Patient was screened for depression during today's encounter. They screened negative with a PHQ-2 score of 0.      Overall recommendations:  Continue Lisinopril 5mg at night (STOP taking amlodipine 5mg daily)  Start taking aldendronate 70mg weekly. Take this medications  on an empty stomach, and stay upright for at least 30 minutes after taking it  Continue Vitamin D 2000U daily   Continue Aspirin 325mg daily   Continue rosuvastatin 20mg nightly   Consider using Coenzyme Q-10 for leg pain   Recommend getting Prevnar 20 and Shingles vaccine and Tdap. Ask your pharmacy about vaccines  Sent Cologuard in  Schedule mammogram. call [phone]    Preventive health issues were discussed with patient, and age appropriate screening tests were ordered as noted in patient's After Visit Summary. Personalized health advice and appropriate referrals for health education or preventive services given if needed, as noted in patient's After Visit Summary.    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  Stopped amlodipine 5mg daily. Reports she has been taking lisinopril 5mg daily. Reports dizziness has resolved after stopping amlodipine.   Walks close to 40 miles per week.   Very active with  woodwork    Reports eating 2 meals per day, well balanced.   Brushing teeth twice per day  Needs to get updated dentist visit  Updated eye exam recently, can see better with new glasses.       Patient Care Team:  Jennifer Smith DO as PCP - General (Family Medicine)    Review of Systems   Constitutional:  Negative for chills, fatigue, fever and unexpected weight change.   HENT:  Negative for congestion, ear pain, hearing loss, rhinorrhea and sore throat.    Eyes:  Negative for pain and visual disturbance.   Respiratory:  Negative for cough and shortness of breath.    Cardiovascular:  Negative for chest pain and palpitations.   Gastrointestinal:  Negative for abdominal pain, blood in stool, constipation and diarrhea.   Genitourinary:  Negative for dysuria and hematuria.   Musculoskeletal:  Negative for arthralgias and back pain.   Skin:  Negative for color change and rash.   Neurological:  Negative for dizziness and headaches.     Annual Wellness Visit Questionnaire       Health Risk Assessment:   Patient rates overall health as very good. Patient feels that their physical health rating is same. Patient is satisfied with their life. Eyesight was rated as slightly better. Hearing was rated as same. Patient feels that their emotional and mental health rating is same. Patients states they are never, rarely angry. Patient states they are sometimes unusually tired/fatigued. Pain experienced in the last 7 days has been some. Patient's pain rating has been 7/10. Patient states that she has experienced no weight loss or gain in last 6 months.     Depression Screening:   PHQ-2 Score: 0      Fall Risk Screening:   In the past year, patient has experienced: history of falling in past year    Number of falls: 1  Injured during fall?: No    Feels unsteady when standing or walking?: No    Worried about falling?: No      Urinary Incontinence Screening:   Patient has not leaked urine accidently in the last six months.      Home Safety:  Patient has trouble with stairs inside or outside of their home. Patient has working smoke alarms and has working carbon monoxide detector. Home safety hazards include: none.     Nutrition:   Current diet is Regular.     Medications:   Patient is currently taking over-the-counter supplements. OTC medications include: see medication list. Patient is able to manage medications.     Activities of Daily Living (ADLs)/Instrumental Activities of Daily Living (IADLs):   Walk and transfer into and out of bed and chair?: Yes  Dress and groom yourself?: Yes    Bathe or shower yourself?: Yes    Feed yourself? Yes  Do your laundry/housekeeping?: Yes  Manage your money, pay your bills and track your expenses?: Yes  Make your own meals?: Yes    Do your own shopping?: Yes    Previous Hospitalizations:   Any hospitalizations or ED visits within the last 12 months?: No      Preventive Screenings      Cardiovascular Screening:    General: Screening Not Indicated and History Lipid Disorder      Diabetes Screening:     General: Screening Current      Colorectal Cancer Screening:     General: Risks and Benefits Discussed and Screening Current    Due for: Cologuard      Breast Cancer Screening:     General: Risks and Benefits Discussed and Screening Current    Due for: Mammogram        Cervical Cancer Screening:    General: Screening Not Indicated      Osteoporosis Screening:    General: Screening Not Indicated and History Osteoporosis      Abdominal Aortic Aneurysm (AAA) Screening:        General: Screening Not Indicated      Lung Cancer Screening:     General: Screening Not Indicated      Hepatitis C Screening:    General: Screening Current    Immunizations:  - Immunizations due: Prevnar 20, Tdap and Zoster (Shingrix)    Screening, Brief Intervention, and Referral to Treatment (SBIRT)     Screening    Typical number of drinks in a week: 0    Single Item Drug Screening:  How often have you used an illegal drug  "(including marijuana) or a prescription medication for non-medical reasons in the past year? never    Single Item Drug Screen Score: 0  Interpretation: Negative screen for possible drug use disorder    SDOH Risk Assessment  Social determinants of health (SDOH) risk assesment tool was completed. The tool at a minimum covered housing stability, food insecurity, transportation needs, and utility difficulty. Patient had at risk responses for the following SDOH domains: transportation needs.     Social Drivers of Health     Food Insecurity: No Food Insecurity (5/16/2025)    Nursing - Inadequate Food Risk Classification    • Worried About Running Out of Food in the Last Year: Never true    • Ran Out of Food in the Last Year: Never true   Transportation Needs: Unmet Transportation Needs (5/16/2025)    PRAPARE - Transportation    • Lack of Transportation (Medical): Yes    • Lack of Transportation (Non-Medical): Yes   Housing Stability: Low Risk  (5/16/2025)    Housing Stability Vital Sign    • Unable to Pay for Housing in the Last Year: No    • Number of Times Moved in the Last Year: 0    • Homeless in the Last Year: No   Utilities: Not At Risk (5/16/2025)    [facility] Utilities    • Threatened with loss of utilities: No     No results found.    Objective {?Quick Links Trend Vitals * Enter New Vitals * Results Review * Timeline (Adult) * Labs * Imaging * Cardiology * Procedures * Lung Cancer Screening * Surgical eConsent :29445}  /81 (BP Location: Right arm, Patient Position: Sitting, Cuff Size: Standard)   Pulse 72   Temp (!) 97.3 °F (36.3 °C) (Temporal)   Ht 4' 11.5\" (1.511 m)   Wt 53.1 kg (117 lb)   SpO2 96%   BMI 23.24 kg/m²       Physical Exam  Vitals and nursing note reviewed.   Constitutional:       General: She is not in acute distress.     Appearance: She is well-developed.   HENT:      Head: Normocephalic and atraumatic.     Eyes:      Conjunctiva/sclera: Conjunctivae normal.     Neck:      Vascular: No " carotid bruit.     Cardiovascular:      Rate and Rhythm: Normal rate and regular rhythm.      Heart sounds: Murmur heard.   Pulmonary:      Effort: Pulmonary effort is normal. No respiratory distress.      Breath sounds: Normal breath sounds. No stridor. No wheezing, rhonchi or rales.   Chest:      Chest wall: No tenderness.   Abdominal:      General: There is no distension.      Palpations: Abdomen is soft. There is no mass.      Tenderness: There is no abdominal tenderness. There is no guarding or rebound.      Hernia: No hernia is present.     Musculoskeletal:         General: No swelling.      Cervical back: Neck supple.      Right lower leg: No edema.      Left lower leg: No edema.     Skin:     General: Skin is warm and dry.      Capillary Refill: Capillary refill takes less than 2 seconds.     Neurological:      Mental Status: She is alert.     Psychiatric:         Mood and Affect: Mood normal.

## 2025-05-16 NOTE — PATIENT INSTRUCTIONS
Continue Lisinopril 5mg at night (STOP taking amlodipine 5mg daily)  Start taking aldendronate 70mg weekly. Take this medications  on an empty stomach, and stay upright for at least 30 minutes after taking it  Continue Vitamin D 2000U daily   Continue Aspirin 325mg daily   Continue rosuvastatin 20mg nightly   Consider using Coenzyme Q-10 for leg pain   Recommend getting Prevnar 20 and Shingles vaccine and Tdap. Ask your pharmacy about vaccines  Sent Cologuard in  Schedule mammogram. call [phone]      Medicare Preventive Visit Patient Instructions  Thank you for completing your Welcome to Medicare Visit or Medicare Annual Wellness Visit today. Your next wellness visit will be due in one year (5/17/2026).  The screening/preventive services that you may require over the next 5-10 years are detailed below. Some tests may not apply to you based off risk factors and/or age. Screening tests ordered at today's visit but not completed yet may show as past due. Also, please note that scanned in results may not display below.  Preventive Screenings:  Service Recommendations Previous Testing/Comments   Colorectal Cancer Screening  * Colonoscopy    * Fecal Occult Blood Test (FOBT)/Fecal Immunochemical Test (FIT)  * Fecal DNA/Cologuard Test  * Flexible Sigmoidoscopy Age: 45-75 years old   Colonoscopy: every 10 years (may be performed more frequently if at higher risk)  OR  FOBT/FIT: every 1 year  OR  Cologuard: every 3 years  OR  Sigmoidoscopy: every 5 years  Screening may be recommended earlier than age 45 if at higher risk for colorectal cancer. Also, an individualized decision between you and your healthcare provider will decide whether screening between the ages of 76-85 would be appropriate. Colonoscopy: Not on file  FOBT/FIT: Not on file  Cologuard: Not on file  Sigmoidoscopy: Not on file          Breast Cancer Screening Age: 40+ years old  Frequency: every 1-2 years  Not required if history of left and right  mastectomy Mammogram: Not on file        Cervical Cancer Screening Between the ages of 21-29, pap smear recommended once every 3 years.   Between the ages of 30-65, can perform pap smear with HPV co-testing every 5 years.   Recommendations may differ for women with a history of total hysterectomy, cervical cancer, or abnormal pap smears in past. Pap Smear: Not on file    Screening Not Indicated   Hepatitis C Screening Once for adults born between 1945 and 1965  More frequently in patients at high risk for Hepatitis C Hep C Antibody: 11/21/2024    Screening Current   Diabetes Screening 1-2 times per year if you're at risk for diabetes or have pre-diabetes Fasting glucose: 84 mg/dL (1/9/2025)  A1C: No results in last 5 years (No results in last 5 years)  Screening Current   Cholesterol Screening Once every 5 years if you don't have a lipid disorder. May order more often based on risk factors. Lipid panel: 11/21/2024    Screening Not Indicated  History Lipid Disorder     Other Preventive Screenings Covered by Medicare:  Abdominal Aortic Aneurysm (AAA) Screening: covered once if your at risk. You're considered to be at risk if you have a family history of AAA.  Lung Cancer Screening: covers low dose CT scan once per year if you meet all of the following conditions: (1) Age 55-77; (2) No signs or symptoms of lung cancer; (3) Current smoker or have quit smoking within the last 15 years; (4) You have a tobacco smoking history of at least 20 pack years (packs per day multiplied by number of years you smoked); (5) You get a written order from a healthcare provider.  Glaucoma Screening: covered annually if you're considered high risk: (1) You have diabetes OR (2) Family history of glaucoma OR (3)  aged 50 and older OR (4)  American aged 65 and older  Osteoporosis Screening: covered every 2 years if you meet one of the following conditions: (1) You're estrogen deficient and at risk for osteoporosis  based off medical history and other findings; (2) Have a vertebral abnormality; (3) On glucocorticoid therapy for more than 3 months; (4) Have primary hyperparathyroidism; (5) On osteoporosis medications and need to assess response to drug therapy.   Last bone density test (DXA Scan): 02/21/2025.  HIV Screening: covered annually if you're between the age of 15-65. Also covered annually if you are younger than 15 and older than 65 with risk factors for HIV infection. For pregnant patients, it is covered up to 3 times per pregnancy.    Immunizations:  Immunization Recommendations   Influenza Vaccine Annual influenza vaccination during flu season is recommended for all persons aged >= 6 months who do not have contraindications   Pneumococcal Vaccine   * Pneumococcal conjugate vaccine = PCV13 (Prevnar 13), PCV15 (Vaxneuvance), PCV20 (Prevnar 20)  * Pneumococcal polysaccharide vaccine = PPSV23 (Pneumovax) Adults 19-63 yo with certain risk factors or if 65+ yo  If never received any pneumonia vaccine: recommend Prevnar 20 (PCV20)  Give PCV20 if previously received 1 dose of PCV13 or PPSV23   Hepatitis B Vaccine 3 dose series if at intermediate or high risk (ex: diabetes, end stage renal disease, liver disease)   Respiratory syncytial virus (RSV) Vaccine - COVERED BY MEDICARE PART D  * RSVPreF3 (Arexvy) CDC recommends that adults 60 years of age and older may receive a single dose of RSV vaccine using shared clinical decision-making (SCDM)   Tetanus (Td) Vaccine - COST NOT COVERED BY MEDICARE PART B Following completion of primary series, a booster dose should be given every 10 years to maintain immunity against tetanus. Td may also be given as tetanus wound prophylaxis.   Tdap Vaccine - COST NOT COVERED BY MEDICARE PART B Recommended at least once for all adults. For pregnant patients, recommended with each pregnancy.   Shingles Vaccine (Shingrix) - COST NOT COVERED BY MEDICARE PART B  2 shot series recommended in those  19 years and older who have or will have weakened immune systems or those 50 years and older     Health Maintenance Due:      Topic Date Due    Hepatitis C Screening  Completed     Immunizations Due:      Topic Date Due    Pneumococcal Vaccine: 65+ Years (1 of 2 - PCV) Never done    COVID-19 Vaccine (4 - 2024-25 season) 09/01/2024     Advance Directives   What are advance directives?  Advance directives are legal documents that state your wishes and plans for medical care. These plans are made ahead of time in case you lose your ability to make decisions for yourself. Advance directives can apply to any medical decision, such as the treatments you want, and if you want to donate organs.   What are the types of advance directives?  There are many types of advance directives, and each state has rules about how to use them. You may choose a combination of any of the following:  Living will:  This is a written record of the treatment you want. You can also choose which treatments you do not want, which to limit, and which to stop at a certain time. This includes surgery, medicine, IV fluid, and tube feedings.   Durable power of  for healthcare (DPAHC):  This is a written record that states who you want to make healthcare choices for you when you are unable to make them for yourself. This person, called a proxy, is usually a family member or a friend. You may choose more than 1 proxy.  Do not resuscitate (DNR) order:  A DNR order is used in case your heart stops beating or you stop breathing. It is a request not to have certain forms of treatment, such as CPR. A DNR order may be included in other types of advance directives.  Medical directive:  This covers the care that you want if you are in a coma, near death, or unable to make decisions for yourself. You can list the treatments you want for each condition. Treatment may include pain medicine, surgery, blood transfusions, dialysis, IV or tube feedings, and a  ventilator (breathing machine).  Values history:  This document has questions about your views, beliefs, and how you feel and think about life. This information can help others choose the care that you would choose.  Why are advance directives important?  An advance directive helps you control your care. Although spoken wishes may be used, it is better to have your wishes written down. Spoken wishes can be misunderstood, or not followed. Treatments may be given even if you do not want them. An advance directive may make it easier for your family to make difficult choices about your care.   Fall Prevention    Fall prevention  includes ways to make your home and other areas safer. It also includes ways you can move more carefully to prevent a fall. Health conditions that cause changes in your blood pressure, vision, or muscle strength and coordination may increase your risk for falls. Medicines may also increase your risk for falls if they make you dizzy, weak, or sleepy.   Fall prevention tips:   Stand or sit up slowly.    Use assistive devices as directed.    Wear shoes that fit well and have soles that .    Wear a personal alarm.    Stay active.    Manage your medical conditions.    Home Safety Tips:  Add items to prevent falls in the bathroom.    Keep paths clear.    Install bright lights in your home.    Keep items you use often on shelves within reach.    Paint or place reflective tape on the edges of your stairs.     © Copyright [employer] 2018 Information is for End User's use only and may not be sold, redistributed or otherwise used for commercial purposes. All illustrations and images included in CareNotes® are the copyrighted property of [employer] or [employer]

## 2025-05-16 NOTE — ASSESSMENT & PLAN NOTE
Blood Pressure: 149/81,  goal BP <140/90.   Home blood pressures: 128-139/70-80s  Cr: 0.85, GFR: 65, normal kidney function  Reports dizziness has resolved after stopping amlodipine.     Plan:  Continue  Lisinopril 5mg daily, consider increasing to lisinopril 10 mg if blood pressure greater than 140/90  Home blood pressure log    ED precautions given

## 2025-05-16 NOTE — ASSESSMENT & PLAN NOTE
Dexa: Osteoporosis. Left total hip T-score: -2.5, Left femoral neck: -3.1, 10 year risk of hip fracture is 9.0% with the 10 year risk of major osteoporotic fracture being 22%   RF: normal weight, family history in maternal cousin  PTH normal, Vit D 33, Urine calcium normal, phos normal, kidney function normal.  Fosamax initiated in May 2025    Plan:  Continue Fosamax 70mg weekly   Continue Vitamin D and calcium supplementation  Continue weight bearing exercise

## 2025-05-19 ENCOUNTER — PATIENT OUTREACH (OUTPATIENT)
Dept: CASE MANAGEMENT | Facility: OTHER | Age: 80
End: 2025-05-19

## 2025-05-19 DIAGNOSIS — Z13.9 ENCOUNTER FOR SCREENING INVOLVING SOCIAL DETERMINANTS OF HEALTH (SDOH): Primary | ICD-10-CM

## 2025-05-21 ENCOUNTER — PATIENT OUTREACH (OUTPATIENT)
Dept: CASE MANAGEMENT | Facility: OTHER | Age: 80
End: 2025-05-21

## 2025-05-21 NOTE — PROGRESS NOTES
Received referral from [facility] Ilene Coleman requesting that [facility] outreach patient and assess for needs. Per chart review, patient had PCP annual visit on 5/16. Reported transportation needs.     Attempted outreaching patient, no answer and left message for return call.

## 2025-05-29 ENCOUNTER — PATIENT OUTREACH (OUTPATIENT)
Dept: CASE MANAGEMENT | Facility: OTHER | Age: 80
End: 2025-05-29

## 2025-05-29 NOTE — PROGRESS NOTES
2nd attempt outreaching patient to assess for needs.  Per chart review, patient had PCP annual visit on 5/16. Reported transportation needs. No answer and left message. UTR letter sent.

## 2025-05-29 NOTE — LETTER
[address] [facility] [address]  [phone]    Re: Unable to reach   5/29/2025       Dear Doreen,    I am a [facility]  and wanted to be certain you had information to contact me should you desire assistance with or have questions about non-medical aspects of your care such as [but not limited to] medical insurance, housing, transportation, material needs, or emergency needs.  If I do not have an answer I will assist you in finding the appropriate agency or individual who can help.      Please feel free to contact me at [phone]. Thank You.    Sincerely,         Marysol Aguilera

## 2025-06-06 LAB — COLOGUARD RESULT REPORTABLE: NEGATIVE

## 2025-06-09 ENCOUNTER — RESULTS FOLLOW-UP (OUTPATIENT)
Dept: FAMILY MEDICINE CLINIC | Facility: CLINIC | Age: 80
End: 2025-06-09

## 2025-07-07 DIAGNOSIS — I10 PRIMARY HYPERTENSION: ICD-10-CM

## 2025-07-07 DIAGNOSIS — E78.00 PURE HYPERCHOLESTEROLEMIA: ICD-10-CM

## 2025-07-09 RX ORDER — ROSUVASTATIN CALCIUM 20 MG/1
20 TABLET, COATED ORAL DAILY
Qty: 90 TABLET | Refills: 1 | Status: SHIPPED | OUTPATIENT
Start: 2025-07-09

## 2025-07-11 ENCOUNTER — ESTABLISHED COMPREHENSIVE EXAM (OUTPATIENT)
Dept: URBAN - METROPOLITAN AREA CLINIC 6 | Facility: CLINIC | Age: 80
End: 2025-07-11

## 2025-07-11 DIAGNOSIS — H33.011: ICD-10-CM

## 2025-07-11 DIAGNOSIS — Z96.1: ICD-10-CM

## 2025-07-11 DIAGNOSIS — H50.10: ICD-10-CM

## 2025-07-11 PROCEDURE — 92250 FUNDUS PHOTOGRAPHY W/I&R: CPT

## 2025-07-11 PROCEDURE — 92014 COMPRE OPH EXAM EST PT 1/>: CPT

## 2025-07-11 ASSESSMENT — KERATOMETRY
OD_AXISANGLE_DEGREES: 165
OD_AXISANGLE2_DEGREES: 75
OS_AXISANGLE_DEGREES: 9
OS_K1POWER_DIOPTERS: 43.25
OS_K2POWER_DIOPTERS: 45.00
OS_AXISANGLE2_DEGREES: 99
OD_K2POWER_DIOPTERS: 46.00
OD_K1POWER_DIOPTERS: 41.50

## 2025-07-11 ASSESSMENT — VISUAL ACUITY
OS_CC: 20/30
OD_CC: 20/40

## 2025-07-11 ASSESSMENT — TONOMETRY
OS_IOP_MMHG: 9
OD_IOP_MMHG: 10

## 2025-07-18 ENCOUNTER — HOSPITAL ENCOUNTER (OUTPATIENT)
Facility: HOSPITAL | Age: 80
End: 2025-07-18
Payer: COMMERCIAL

## 2025-07-18 VITALS — BODY MASS INDEX: 22.19 KG/M2 | HEIGHT: 60 IN | WEIGHT: 113 LBS

## 2025-07-18 DIAGNOSIS — Z12.31 ENCOUNTER FOR SCREENING MAMMOGRAM FOR BREAST CANCER: ICD-10-CM

## 2025-07-18 PROCEDURE — 77067 SCR MAMMO BI INCL CAD: CPT

## 2025-07-18 PROCEDURE — 77063 BREAST TOMOSYNTHESIS BI: CPT
